# Patient Record
Sex: FEMALE | Race: WHITE | NOT HISPANIC OR LATINO | Employment: OTHER | URBAN - METROPOLITAN AREA
[De-identification: names, ages, dates, MRNs, and addresses within clinical notes are randomized per-mention and may not be internally consistent; named-entity substitution may affect disease eponyms.]

---

## 2017-01-09 RX ORDER — AMLODIPINE BESYLATE 10 MG/1
10 TABLET ORAL NIGHTLY
Qty: 30 TABLET | Refills: 5 | Status: SHIPPED | OUTPATIENT
Start: 2017-01-09 | End: 2017-02-10 | Stop reason: SDUPTHER

## 2017-01-20 DIAGNOSIS — E11.9 TYPE 2 DIABETES MELLITUS WITHOUT COMPLICATION: ICD-10-CM

## 2017-01-23 ENCOUNTER — CLINICAL SUPPORT (OUTPATIENT)
Dept: ELECTROPHYSIOLOGY | Facility: CLINIC | Age: 62
End: 2017-01-23

## 2017-01-23 DIAGNOSIS — Z95.818 STATUS POST PLACEMENT OF IMPLANTABLE LOOP RECORDER: ICD-10-CM

## 2017-01-23 DIAGNOSIS — I63.9 CRYPTOGENIC STROKE: ICD-10-CM

## 2017-01-23 PROCEDURE — 93299 LOOP RECORDER REMOTE: CPT | Mod: PBBFAC | Performed by: INTERNAL MEDICINE

## 2017-01-23 PROCEDURE — 93297 REM INTERROG DEV EVAL ICPMS: CPT | Mod: ,,, | Performed by: INTERNAL MEDICINE

## 2017-01-23 RX ORDER — FLUTICASONE PROPIONATE 50 MCG
2 SPRAY, SUSPENSION (ML) NASAL DAILY
Qty: 16 G | Refills: 1 | Status: SHIPPED | OUTPATIENT
Start: 2017-01-23 | End: 2017-11-15 | Stop reason: SDUPTHER

## 2017-02-10 RX ORDER — AMLODIPINE BESYLATE 10 MG/1
10 TABLET ORAL NIGHTLY
Qty: 30 TABLET | Refills: 5 | Status: SHIPPED | OUTPATIENT
Start: 2017-02-10 | End: 2017-02-13 | Stop reason: SDUPTHER

## 2017-02-13 ENCOUNTER — PATIENT MESSAGE (OUTPATIENT)
Dept: INTERNAL MEDICINE | Facility: CLINIC | Age: 62
End: 2017-02-13

## 2017-02-13 RX ORDER — AMLODIPINE BESYLATE 10 MG/1
10 TABLET ORAL NIGHTLY
Qty: 30 TABLET | Refills: 5 | Status: SHIPPED | OUTPATIENT
Start: 2017-02-13 | End: 2017-07-24 | Stop reason: SDUPTHER

## 2017-02-23 ENCOUNTER — CLINICAL SUPPORT (OUTPATIENT)
Dept: ELECTROPHYSIOLOGY | Facility: CLINIC | Age: 62
End: 2017-02-23

## 2017-02-23 DIAGNOSIS — Z95.818 STATUS POST PLACEMENT OF IMPLANTABLE LOOP RECORDER: ICD-10-CM

## 2017-02-23 DIAGNOSIS — I63.9 CRYPTOGENIC STROKE: ICD-10-CM

## 2017-02-23 PROCEDURE — 93297 REM INTERROG DEV EVAL ICPMS: CPT | Mod: ,,, | Performed by: INTERNAL MEDICINE

## 2017-02-23 PROCEDURE — 93299 LOOP RECORDER REMOTE: CPT | Mod: PBBFAC | Performed by: INTERNAL MEDICINE

## 2017-03-27 ENCOUNTER — CLINICAL SUPPORT (OUTPATIENT)
Dept: ELECTROPHYSIOLOGY | Facility: CLINIC | Age: 62
End: 2017-03-27

## 2017-03-27 DIAGNOSIS — I63.9 CRYPTOGENIC STROKE: ICD-10-CM

## 2017-03-27 DIAGNOSIS — Z95.818 STATUS POST PLACEMENT OF IMPLANTABLE LOOP RECORDER: ICD-10-CM

## 2017-03-27 PROCEDURE — 93299 LOOP RECORDER REMOTE: CPT | Mod: PBBFAC | Performed by: INTERNAL MEDICINE

## 2017-04-17 DIAGNOSIS — E11.9 TYPE 2 DIABETES MELLITUS WITHOUT COMPLICATION: ICD-10-CM

## 2017-04-28 PROCEDURE — 93297 REM INTERROG DEV EVAL ICPMS: CPT | Mod: ,,, | Performed by: INTERNAL MEDICINE

## 2017-05-29 ENCOUNTER — CLINICAL SUPPORT (OUTPATIENT)
Dept: ELECTROPHYSIOLOGY | Facility: CLINIC | Age: 62
End: 2017-05-29

## 2017-05-29 DIAGNOSIS — I63.9 CRYPTOGENIC STROKE: ICD-10-CM

## 2017-05-29 DIAGNOSIS — Z95.818 STATUS POST PLACEMENT OF IMPLANTABLE LOOP RECORDER: ICD-10-CM

## 2017-05-29 PROCEDURE — 93297 REM INTERROG DEV EVAL ICPMS: CPT | Mod: ,,, | Performed by: INTERNAL MEDICINE

## 2017-05-29 PROCEDURE — 93299 LOOP RECORDER REMOTE: CPT | Mod: ,,, | Performed by: INTERNAL MEDICINE

## 2017-06-02 ENCOUNTER — PATIENT MESSAGE (OUTPATIENT)
Dept: ELECTROPHYSIOLOGY | Facility: CLINIC | Age: 62
End: 2017-06-02

## 2017-07-10 ENCOUNTER — PATIENT MESSAGE (OUTPATIENT)
Dept: INTERNAL MEDICINE | Facility: CLINIC | Age: 62
End: 2017-07-10

## 2017-07-20 ENCOUNTER — CLINICAL SUPPORT (OUTPATIENT)
Dept: ELECTROPHYSIOLOGY | Facility: CLINIC | Age: 62
End: 2017-07-20

## 2017-07-20 DIAGNOSIS — Z95.818 STATUS POST PLACEMENT OF IMPLANTABLE LOOP RECORDER: ICD-10-CM

## 2017-07-20 DIAGNOSIS — I63.9 CRYPTOGENIC STROKE: ICD-10-CM

## 2017-07-20 PROCEDURE — 93299 LOOP RECORDER REMOTE: CPT | Mod: PBBFAC | Performed by: INTERNAL MEDICINE

## 2017-07-20 PROCEDURE — 93297 REM INTERROG DEV EVAL ICPMS: CPT | Mod: ,,, | Performed by: INTERNAL MEDICINE

## 2017-07-24 ENCOUNTER — PATIENT MESSAGE (OUTPATIENT)
Dept: INTERNAL MEDICINE | Facility: CLINIC | Age: 62
End: 2017-07-24

## 2017-07-25 RX ORDER — AMLODIPINE BESYLATE 10 MG/1
TABLET ORAL
Qty: 30 TABLET | Refills: 0 | Status: SHIPPED | OUTPATIENT
Start: 2017-07-25 | End: 2017-08-21 | Stop reason: SDUPTHER

## 2017-07-25 RX ORDER — AMLODIPINE BESYLATE 10 MG/1
10 TABLET ORAL NIGHTLY
Qty: 30 TABLET | Refills: 0 | Status: SHIPPED | OUTPATIENT
Start: 2017-07-25 | End: 2017-07-25 | Stop reason: SDUPTHER

## 2017-07-25 NOTE — TELEPHONE ENCOUNTER
I can give the patient a month of medication, but she needs to be seen because she is hypertensive and we need to address that ASAP. Thank you.

## 2017-07-28 DIAGNOSIS — Z12.11 COLON CANCER SCREENING: ICD-10-CM

## 2017-08-03 DIAGNOSIS — I63.449 CEREBROVASCULAR ACCIDENT (CVA) DUE TO EMBOLISM OF CEREBELLAR ARTERY, UNSPECIFIED BLOOD VESSEL LATERALITY: Primary | ICD-10-CM

## 2017-08-11 ENCOUNTER — OFFICE VISIT (OUTPATIENT)
Dept: ELECTROPHYSIOLOGY | Facility: CLINIC | Age: 62
End: 2017-08-11

## 2017-08-11 ENCOUNTER — HOSPITAL ENCOUNTER (OUTPATIENT)
Dept: CARDIOLOGY | Facility: CLINIC | Age: 62
Discharge: HOME | End: 2017-08-11

## 2017-08-11 VITALS
HEIGHT: 62 IN | WEIGHT: 148.13 LBS | DIASTOLIC BLOOD PRESSURE: 86 MMHG | SYSTOLIC BLOOD PRESSURE: 152 MMHG | HEART RATE: 67 BPM | BODY MASS INDEX: 27.26 KG/M2

## 2017-08-11 DIAGNOSIS — I10 HYPERTENSION, ESSENTIAL: ICD-10-CM

## 2017-08-11 DIAGNOSIS — I63.10 CEREBROVASCULAR ACCIDENT (CVA) DUE TO EMBOLISM OF PRECEREBRAL ARTERY: ICD-10-CM

## 2017-08-11 DIAGNOSIS — I63.511: Primary | ICD-10-CM

## 2017-08-11 DIAGNOSIS — Z91.199 NONCOMPLIANCE: ICD-10-CM

## 2017-08-11 DIAGNOSIS — I63.449 CEREBROVASCULAR ACCIDENT (CVA) DUE TO EMBOLISM OF CEREBELLAR ARTERY, UNSPECIFIED BLOOD VESSEL LATERALITY: ICD-10-CM

## 2017-08-11 DIAGNOSIS — E11.40 TYPE 2 DIABETES MELLITUS WITH DIABETIC NEUROPATHY, WITHOUT LONG-TERM CURRENT USE OF INSULIN: ICD-10-CM

## 2017-08-11 PROCEDURE — 99213 OFFICE O/P EST LOW 20 MIN: CPT | Mod: PBBFAC | Performed by: INTERNAL MEDICINE

## 2017-08-11 PROCEDURE — 3077F SYST BP >= 140 MM HG: CPT | Mod: ,,, | Performed by: INTERNAL MEDICINE

## 2017-08-11 PROCEDURE — 99214 OFFICE O/P EST MOD 30 MIN: CPT | Mod: S$PBB,,, | Performed by: INTERNAL MEDICINE

## 2017-08-11 PROCEDURE — 93005 ELECTROCARDIOGRAM TRACING: CPT | Mod: PBBFAC | Performed by: INTERNAL MEDICINE

## 2017-08-11 PROCEDURE — 3079F DIAST BP 80-89 MM HG: CPT | Mod: ,,, | Performed by: INTERNAL MEDICINE

## 2017-08-11 PROCEDURE — 99999 PR PBB SHADOW E&M-EST. PATIENT-LVL III: CPT | Mod: PBBFAC,,, | Performed by: INTERNAL MEDICINE

## 2017-08-11 PROCEDURE — 93010 ELECTROCARDIOGRAM REPORT: CPT | Mod: S$PBB,,, | Performed by: INTERNAL MEDICINE

## 2017-08-11 PROCEDURE — 3008F BODY MASS INDEX DOCD: CPT | Mod: ,,, | Performed by: INTERNAL MEDICINE

## 2017-08-11 RX ORDER — ASPIRIN 325 MG
325 TABLET ORAL DAILY
COMMUNITY

## 2017-08-11 RX ORDER — PYRIDOXINE HCL (VITAMIN B6) 100 MG
50 TABLET ORAL DAILY
COMMUNITY

## 2017-08-11 RX ORDER — IBUPROFEN 200 MG
200 TABLET ORAL EVERY 6 HOURS PRN
COMMUNITY

## 2017-08-11 RX ORDER — MULTIVIT WITH MINERALS/HERBS
1 TABLET ORAL DAILY
COMMUNITY

## 2017-08-11 NOTE — PROGRESS NOTES
Subjective:    Patient ID:  Devika Ashby is a 62 y.o. female who presents for follow-up of Embolic Stroke      HPI 63 yo female with Htn, DM, CVA,   [place] [facility] [place] with complaints of dizziness, an abnormal gait, and slurred speech.  CT head (04/11/16) revealed an age-indeterminate infarct of right caudate.  MRI brain (04/11/16) revealed an acute infarction in the right pontine hemisphere and left thalamus with subacute infarction in the left corona radiata. Ms. Ashby was found not to be a tPA candidate (2/2) as she was outside of the treatment window; no vascular intervention was required.   Ms. Ashby underwent ILR placement (04/13/16) without complication and was subsequently discharged.   ILR monitoring to date has not revealed significant arrhythmias.  Feeling well overall.  Denies palpitations, syncope, dizziness.  Has had less stress since quitting her job in December.      Review of Systems   Constitution: Negative. Negative for weakness and malaise/fatigue.   Cardiovascular: Negative for chest pain, dyspnea on exertion, irregular heartbeat, leg swelling, near-syncope, orthopnea, palpitations, paroxysmal nocturnal dyspnea and syncope.   Respiratory: Negative for cough and shortness of breath.    Neurological: Negative for dizziness and light-headedness.   All other systems reviewed and are negative.       Objective:    Physical Exam   Constitutional: She is oriented to person, place, and time. She appears well-developed and well-nourished.   Eyes: Conjunctivae are normal. No scleral icterus.   Neck: No JVD present. No tracheal deviation present.   Cardiovascular: Normal rate and regular rhythm.  PMI is not displaced.    Pulmonary/Chest: Effort normal and breath sounds normal. No respiratory distress.   Abdominal: Soft. There is no hepatosplenomegaly. There is no tenderness.   Musculoskeletal: She exhibits no edema or tenderness.   Neurological: She is alert and oriented to person,  place, and time.   Skin: Skin is warm and dry. No rash noted.   Psychiatric: She has a normal mood and affect. Her behavior is normal.         Assessment:       1. Cerebrovascular accident (CVA) involving right middle cerebral artery territory    2. Cerebrovascular accident (CVA) due to embolism of precerebral artery    3. Noncompliance    4. Hypertension, essential    5. Type 2 diabetes mellitus with diabetic neuropathy, without long-term current use of insulin         Plan:           Doing well with no evidence of atrial fibrillation to date.  I had recommended initiation of ace inhibitor.  She reports problems with this in the past, in that it increased her blood pressure.  She reports being on many different anti-hypertensives.  I recommended initiation of anti-hypertensive, she is strongly against it (and becomes a bit heated in discussion about it).  She understands risks regarding not being on therapy, including repeat stroke.  F/u in one year

## 2017-08-21 ENCOUNTER — CLINICAL SUPPORT (OUTPATIENT)
Dept: ELECTROPHYSIOLOGY | Facility: CLINIC | Age: 62
End: 2017-08-21

## 2017-08-21 ENCOUNTER — OFFICE VISIT (OUTPATIENT)
Dept: INTERNAL MEDICINE | Facility: CLINIC | Age: 62
End: 2017-08-21
Attending: FAMILY MEDICINE

## 2017-08-21 VITALS
HEIGHT: 62 IN | DIASTOLIC BLOOD PRESSURE: 76 MMHG | WEIGHT: 145.5 LBS | HEART RATE: 79 BPM | OXYGEN SATURATION: 97 % | BODY MASS INDEX: 26.78 KG/M2 | SYSTOLIC BLOOD PRESSURE: 142 MMHG

## 2017-08-21 DIAGNOSIS — Z91.199 NONCOMPLIANCE: ICD-10-CM

## 2017-08-21 DIAGNOSIS — Z95.818 STATUS POST PLACEMENT OF IMPLANTABLE LOOP RECORDER: ICD-10-CM

## 2017-08-21 DIAGNOSIS — G70.9 NEUROMUSCULAR DISEASE: ICD-10-CM

## 2017-08-21 DIAGNOSIS — E11.40 TYPE 2 DIABETES MELLITUS WITH DIABETIC NEUROPATHY, WITHOUT LONG-TERM CURRENT USE OF INSULIN: ICD-10-CM

## 2017-08-21 DIAGNOSIS — I63.511: ICD-10-CM

## 2017-08-21 DIAGNOSIS — I63.9 CRYPTOGENIC STROKE: ICD-10-CM

## 2017-08-21 DIAGNOSIS — I10 HYPERTENSION, ESSENTIAL: ICD-10-CM

## 2017-08-21 DIAGNOSIS — E78.5 HYPERLIPIDEMIA, UNSPECIFIED HYPERLIPIDEMIA TYPE: ICD-10-CM

## 2017-08-21 DIAGNOSIS — Z91.199 NONCOMPLIANCE BY REFUSING INTERVENTION OR SUPPORT: ICD-10-CM

## 2017-08-21 PROCEDURE — 99214 OFFICE O/P EST MOD 30 MIN: CPT | Mod: PBBFAC | Performed by: FAMILY MEDICINE

## 2017-08-21 PROCEDURE — 3078F DIAST BP <80 MM HG: CPT | Mod: ,,, | Performed by: FAMILY MEDICINE

## 2017-08-21 PROCEDURE — 99214 OFFICE O/P EST MOD 30 MIN: CPT | Mod: S$PBB,,, | Performed by: FAMILY MEDICINE

## 2017-08-21 PROCEDURE — 3008F BODY MASS INDEX DOCD: CPT | Mod: ,,, | Performed by: FAMILY MEDICINE

## 2017-08-21 PROCEDURE — 99999 PR PBB SHADOW E&M-EST. PATIENT-LVL IV: CPT | Mod: PBBFAC,,, | Performed by: FAMILY MEDICINE

## 2017-08-21 PROCEDURE — 3077F SYST BP >= 140 MM HG: CPT | Mod: ,,, | Performed by: FAMILY MEDICINE

## 2017-08-21 PROCEDURE — 93299 LOOP RECORDER REMOTE: CPT | Mod: PBBFAC | Performed by: INTERNAL MEDICINE

## 2017-08-21 RX ORDER — MUPIROCIN 20 MG/G
OINTMENT TOPICAL 3 TIMES DAILY
Qty: 22 G | Refills: 1 | Status: SHIPPED | OUTPATIENT
Start: 2017-08-21 | End: 2017-08-28

## 2017-08-21 RX ORDER — AMLODIPINE BESYLATE 10 MG/1
10 TABLET ORAL NIGHTLY
Qty: 30 TABLET | Refills: 3 | Status: SHIPPED | OUTPATIENT
Start: 2017-08-21 | End: 2017-11-15 | Stop reason: SDUPTHER

## 2017-08-21 NOTE — PROGRESS NOTES
Subjective:       Patient ID: Devika Ashby is a 62 y.o. female.    Chief Complaint: Medication Refill    HPI  Review of Systems   Constitutional: Negative for chills, fatigue and fever.   HENT: Negative for congestion and trouble swallowing.    Eyes: Negative for redness.   Respiratory: Negative for cough, chest tightness and shortness of breath.    Cardiovascular: Negative for chest pain, palpitations and leg swelling.   Gastrointestinal: Negative for abdominal pain and blood in stool.   Genitourinary: Negative for hematuria and menstrual problem.   Musculoskeletal: Negative for arthralgias, back pain, gait problem, joint swelling, myalgias and neck pain.   Skin: Negative for color change and rash.   Neurological: Negative for tremors, speech difficulty, weakness, numbness and headaches.   Hematological: Negative for adenopathy. Does not bruise/bleed easily.   Psychiatric/Behavioral: Negative for behavioral problems, confusion and sleep disturbance. The patient is not nervous/anxious.        Objective:      Physical Exam   Constitutional: She is oriented to person, place, and time. She appears well-developed and well-nourished. No distress.   Neck: Neck supple.   Cardiovascular:   Pulses:       Dorsalis pedis pulses are 2+ on the right side, and 2+ on the left side.   Pulmonary/Chest: Effort normal.   Musculoskeletal: She exhibits no edema.        Right lower leg: She exhibits no edema.        Left lower leg: She exhibits no edema.   Feet:   Right Foot:   Protective Sensation: 3 sites tested. 3 sites sensed.   Skin Integrity: Negative for skin breakdown.   Left Foot:   Protective Sensation: 3 sites tested. 3 sites sensed.   Skin Integrity: Negative for skin breakdown.   Neurological: She is alert and oriented to person, place, and time. GCS eye subscore is 4. GCS verbal subscore is 5. GCS motor subscore is 6.   Skin: Skin is warm and dry. No rash noted.   Psychiatric: She has a normal mood and affect. Her  behavior is normal. Judgment and thought content normal. Her speech is slurred.   Nursing note and vitals reviewed.      Assessment:       1. Uncontrolled type 2 diabetes mellitus with complication, without long-term current use of insulin    2. Hypertension, essential    3. Hyperlipidemia, unspecified hyperlipidemia type    4. Type 2 diabetes mellitus with diabetic neuropathy, without long-term current use of insulin    5. Cerebrovascular accident (CVA) involving right middle cerebral artery territory    6. Noncompliance    7. Noncompliance by refusing intervention or support    8. Neuromuscular disease        Plan:   Devika was seen today for medication refill.    Diagnoses and all orders for this visit:    Uncontrolled type 2 diabetes mellitus with complication, without long-term current use of insulin  -     Hemoglobin A1c; Future  -     Lipid panel; Future  -     Comprehensive metabolic panel; Future  -     Microalbumin/creatinine urine ratio; Future  -     Ambulatory referral to Optometry  -     Ambulatory consult to Podiatry  -     Ambulatory Referral to Diabetes Education    Hypertension, essential    Hyperlipidemia, unspecified hyperlipidemia type    Type 2 diabetes mellitus with diabetic neuropathy, without long-term current use of insulin  -     Hemoglobin A1c; Future  -     Lipid panel; Future  -     Comprehensive metabolic panel; Future  -     Microalbumin/creatinine urine ratio; Future  -     Ambulatory referral to Optometry  -     Ambulatory consult to Podiatry  -     Ambulatory Referral to Diabetes Education    Cerebrovascular accident (CVA) involving right middle cerebral artery territory    Noncompliance    Noncompliance by refusing intervention or support    Neuromuscular disease    Other orders  -     amlodipine (NORVASC) 10 MG tablet; Take 1 tablet (10 mg total) by mouth every evening.  -     mupirocin (BACTROBAN) 2 % ointment; Apply topically 3 (three) times daily.      See meds, orders, follow  "up, routing and instructions sections of encounter.    "This note will not be shared with the patient."    A 62-year-old established female patient.  She is in for a followup at our   request.  She was out of medications.  The patient was counseled about health   maintenance and her medical conditions.  She refused treatment as follows:    Refuses health screenings such as mammogram, colonoscopy, and female health.    Refuses treatment for her hypertension other than Norvasc and states that she   has intolerances to all other blood pressure medications.    Refuses treatment for diabetes, stating she has intolerance to all diabetic   medications including insulin.    Refuses treatment for dyslipidemia and states she has some sort of neuromuscular   condition that is being treated through a neurosurgeon.  When asked who the   neurosurgeon is.  She states she will not provide his name because she is in   "some sort of research study."    I explained the consequences of not treating her current medical conditions   including, but not limited to, stroke, death, heart attack, limb amputation,   blindness and renal failure, possibly requiring dialysis.    The patient will only allow us to refill Norvasc today and she states she needs   a refill of topical mupirocin stating that OTC creams do not work.      MARISA/IN  dd: 08/21/2017 14:35:42 (CDT)  td: 08/21/2017 23:32:40 (CDT)  Doc ID   #[id number]  Job ID #[id number]    CC:       "

## 2017-08-22 ENCOUNTER — PATIENT MESSAGE (OUTPATIENT)
Dept: INTERNAL MEDICINE | Facility: CLINIC | Age: 62
End: 2017-08-22

## 2017-08-24 ENCOUNTER — TELEPHONE (OUTPATIENT)
Dept: DIABETES | Facility: CLINIC | Age: 62
End: 2017-08-24

## 2017-08-25 ENCOUNTER — TELEPHONE (OUTPATIENT)
Dept: DIABETES | Facility: CLINIC | Age: 62
End: 2017-08-25

## 2017-08-29 PROCEDURE — 93297 REM INTERROG DEV EVAL ICPMS: CPT | Mod: ,,, | Performed by: INTERNAL MEDICINE

## 2017-10-03 ENCOUNTER — CLINICAL SUPPORT (OUTPATIENT)
Dept: ELECTROPHYSIOLOGY | Facility: CLINIC | Age: 62
End: 2017-10-03

## 2017-10-03 DIAGNOSIS — I63.9 CRYPTOGENIC STROKE: ICD-10-CM

## 2017-10-03 DIAGNOSIS — Z95.818 STATUS POST PLACEMENT OF IMPLANTABLE LOOP RECORDER: ICD-10-CM

## 2017-10-03 PROCEDURE — 93298 REM INTERROG DEV EVAL SCRMS: CPT | Mod: ,,, | Performed by: INTERNAL MEDICINE

## 2017-10-03 PROCEDURE — 93299 LOOP RECORDER REMOTE: CPT | Mod: PBBFAC | Performed by: INTERNAL MEDICINE

## 2017-10-03 PROCEDURE — 93297 REM INTERROG DEV EVAL ICPMS: CPT | Mod: ,,, | Performed by: INTERNAL MEDICINE

## 2017-10-05 ENCOUNTER — LAB VISIT (OUTPATIENT)
Dept: LAB | Facility: HOSPITAL | Age: 62
End: 2017-10-05
Attending: FAMILY MEDICINE

## 2017-10-05 DIAGNOSIS — E11.40 TYPE 2 DIABETES MELLITUS WITH DIABETIC NEUROPATHY, WITHOUT LONG-TERM CURRENT USE OF INSULIN: ICD-10-CM

## 2017-10-05 LAB
ALBUMIN SERPL BCP-MCNC: 4 G/DL
ALP SERPL-CCNC: 155 U/L
ALT SERPL W/O P-5'-P-CCNC: 22 U/L
ANION GAP SERPL CALC-SCNC: 12 MMOL/L
AST SERPL-CCNC: 17 U/L
BILIRUB SERPL-MCNC: 0.6 MG/DL
BUN SERPL-MCNC: 21 MG/DL
CALCIUM SERPL-MCNC: 9.5 MG/DL
CHLORIDE SERPL-SCNC: 105 MMOL/L
CHOLEST SERPL-MCNC: 293 MG/DL
CHOLEST/HDLC SERPL: 8.1 {RATIO}
CO2 SERPL-SCNC: 24 MMOL/L
CREAT SERPL-MCNC: 1.1 MG/DL
EST. GFR  (AFRICAN AMERICAN): >60 ML/MIN/1.73 M^2
EST. GFR  (NON AFRICAN AMERICAN): 53.2 ML/MIN/1.73 M^2
ESTIMATED AVG GLUCOSE: 197 MG/DL
GLUCOSE SERPL-MCNC: 229 MG/DL
HBA1C MFR BLD HPLC: 8.5 %
HDLC SERPL-MCNC: 36 MG/DL
HDLC SERPL: 12.3 %
LDLC SERPL CALC-MCNC: 198 MG/DL
NONHDLC SERPL-MCNC: 257 MG/DL
POTASSIUM SERPL-SCNC: 3.9 MMOL/L
PROT SERPL-MCNC: 7.3 G/DL
SODIUM SERPL-SCNC: 141 MMOL/L
TRIGL SERPL-MCNC: 295 MG/DL

## 2017-10-05 PROCEDURE — 80053 COMPREHEN METABOLIC PANEL: CPT

## 2017-10-05 PROCEDURE — 36415 COLL VENOUS BLD VENIPUNCTURE: CPT

## 2017-10-05 PROCEDURE — 80061 LIPID PANEL: CPT

## 2017-10-05 PROCEDURE — 83036 HEMOGLOBIN GLYCOSYLATED A1C: CPT

## 2017-10-07 ENCOUNTER — TELEPHONE (OUTPATIENT)
Dept: INTERNAL MEDICINE | Facility: CLINIC | Age: 62
End: 2017-10-07

## 2017-10-07 ENCOUNTER — PATIENT MESSAGE (OUTPATIENT)
Dept: INTERNAL MEDICINE | Facility: CLINIC | Age: 62
End: 2017-10-07

## 2017-10-10 PROBLEM — Z53.20 REFUSAL OF STATIN MEDICATION BY PATIENT: Status: ACTIVE | Noted: 2017-10-10

## 2017-10-21 ENCOUNTER — TELEPHONE (OUTPATIENT)
Dept: INTERNAL MEDICINE | Facility: CLINIC | Age: 62
End: 2017-10-21

## 2017-10-21 ENCOUNTER — PATIENT MESSAGE (OUTPATIENT)
Dept: INTERNAL MEDICINE | Facility: CLINIC | Age: 62
End: 2017-10-21

## 2017-10-21 NOTE — TELEPHONE ENCOUNTER
Called patient and discussed labs and or test results. Patient expressed understanding and had the opportunity to ask questions. Any questions were answered. See meds, orders, follow up and instructions sections of encounter.    Specific issues include:  Chol and DM    Patient states she cannot take insulin  Patient states she cannot take Chol med    Refuses Cardiology and Endocrinology referrals    Explained to her that her decision is dangerous based on excess risk - I explained that I am also obliged to make sensible recommendations that will benefit health, make  life better and are within the standards of medical care. Diabetes and elevated blood cholesterol are known high risk conditions for having heart attacks, recurrent stroke, kidney disease, eye and limb problems, etc.     Patient states that she understands, wants to manage this on her own and does not want to make a follow up appt at this time.

## 2017-10-24 ENCOUNTER — PATIENT MESSAGE (OUTPATIENT)
Dept: INTERNAL MEDICINE | Facility: CLINIC | Age: 62
End: 2017-10-24

## 2017-11-03 ENCOUNTER — CLINICAL SUPPORT (OUTPATIENT)
Dept: ELECTROPHYSIOLOGY | Facility: CLINIC | Age: 62
End: 2017-11-03
Attending: INTERNAL MEDICINE

## 2017-11-03 DIAGNOSIS — Z95.818 STATUS POST PLACEMENT OF IMPLANTABLE LOOP RECORDER: ICD-10-CM

## 2017-11-03 DIAGNOSIS — I63.9 CRYPTOGENIC STROKE: ICD-10-CM

## 2017-11-03 PROCEDURE — 93299 LOOP RECORDER REMOTE: CPT | Mod: PBBFAC | Performed by: INTERNAL MEDICINE

## 2017-11-03 PROCEDURE — 93298 REM INTERROG DEV EVAL SCRMS: CPT | Mod: ,,, | Performed by: INTERNAL MEDICINE

## 2017-11-10 ENCOUNTER — PATIENT MESSAGE (OUTPATIENT)
Dept: ADMINISTRATIVE | Facility: HOSPITAL | Age: 62
End: 2017-11-10

## 2017-11-15 ENCOUNTER — PATIENT MESSAGE (OUTPATIENT)
Dept: INTERNAL MEDICINE | Facility: CLINIC | Age: 62
End: 2017-11-15

## 2017-11-15 RX ORDER — AMLODIPINE BESYLATE 10 MG/1
10 TABLET ORAL NIGHTLY
Qty: 30 TABLET | Refills: 3 | Status: SHIPPED | OUTPATIENT
Start: 2017-11-15 | End: 2018-02-19 | Stop reason: SDUPTHER

## 2017-11-15 RX ORDER — FLUTICASONE PROPIONATE 50 MCG
2 SPRAY, SUSPENSION (ML) NASAL DAILY
Qty: 16 G | Refills: 1 | Status: SHIPPED | OUTPATIENT
Start: 2017-11-15

## 2017-12-04 ENCOUNTER — CLINICAL SUPPORT (OUTPATIENT)
Dept: ELECTROPHYSIOLOGY | Facility: CLINIC | Age: 62
End: 2017-12-04
Attending: INTERNAL MEDICINE

## 2017-12-04 DIAGNOSIS — Z95.818 STATUS POST PLACEMENT OF IMPLANTABLE LOOP RECORDER: ICD-10-CM

## 2017-12-04 DIAGNOSIS — I63.9 CRYPTOGENIC STROKE: ICD-10-CM

## 2017-12-04 PROCEDURE — 93298 REM INTERROG DEV EVAL SCRMS: CPT | Mod: ,,, | Performed by: INTERNAL MEDICINE

## 2017-12-04 PROCEDURE — 93299 LOOP RECORDER REMOTE: CPT | Mod: PBBFAC | Performed by: INTERNAL MEDICINE

## 2018-01-04 ENCOUNTER — CLINICAL SUPPORT (OUTPATIENT)
Dept: ELECTROPHYSIOLOGY | Facility: CLINIC | Age: 63
End: 2018-01-04
Attending: INTERNAL MEDICINE

## 2018-01-04 DIAGNOSIS — Z95.818 STATUS POST PLACEMENT OF IMPLANTABLE LOOP RECORDER: ICD-10-CM

## 2018-01-04 DIAGNOSIS — I63.9 CRYPTOGENIC STROKE: ICD-10-CM

## 2018-01-04 PROCEDURE — 93299 LOOP RECORDER REMOTE: CPT | Mod: PBBFAC | Performed by: INTERNAL MEDICINE

## 2018-01-04 PROCEDURE — 93298 REM INTERROG DEV EVAL SCRMS: CPT | Mod: ,,, | Performed by: INTERNAL MEDICINE

## 2018-01-27 DIAGNOSIS — Z95.818 STATUS POST PLACEMENT OF IMPLANTABLE LOOP RECORDER: Primary | ICD-10-CM

## 2018-01-27 DIAGNOSIS — I63.9 CRYPTOGENIC STROKE: ICD-10-CM

## 2018-02-06 ENCOUNTER — CLINICAL SUPPORT (OUTPATIENT)
Dept: ELECTROPHYSIOLOGY | Facility: CLINIC | Age: 63
End: 2018-02-06
Attending: INTERNAL MEDICINE

## 2018-02-06 DIAGNOSIS — Z95.818 STATUS POST PLACEMENT OF IMPLANTABLE LOOP RECORDER: ICD-10-CM

## 2018-02-06 DIAGNOSIS — I63.9 CRYPTOGENIC STROKE: ICD-10-CM

## 2018-02-21 ENCOUNTER — PATIENT MESSAGE (OUTPATIENT)
Dept: INTERNAL MEDICINE | Facility: CLINIC | Age: 63
End: 2018-02-21

## 2018-02-21 RX ORDER — AMLODIPINE BESYLATE 10 MG/1
10 TABLET ORAL NIGHTLY
Qty: 30 TABLET | Refills: 0 | Status: SHIPPED | OUTPATIENT
Start: 2018-02-21 | End: 2018-03-14 | Stop reason: SDUPTHER

## 2018-02-22 ENCOUNTER — PATIENT MESSAGE (OUTPATIENT)
Dept: INTERNAL MEDICINE | Facility: CLINIC | Age: 63
End: 2018-02-22

## 2018-02-22 NOTE — TELEPHONE ENCOUNTER
Please call patient and schedule patient for an appointment with me - she has not been seen recently and will need an appointment to continue to get medicaiton refills. Thank you.

## 2018-02-22 NOTE — TELEPHONE ENCOUNTER
Called pt no answer left message on voicemail and pt portal requesting a call back to schedule appointment

## 2018-03-08 ENCOUNTER — CLINICAL SUPPORT (OUTPATIENT)
Dept: ELECTROPHYSIOLOGY | Facility: CLINIC | Age: 63
End: 2018-03-08
Attending: INTERNAL MEDICINE

## 2018-03-08 DIAGNOSIS — Z95.818 STATUS POST PLACEMENT OF IMPLANTABLE LOOP RECORDER: ICD-10-CM

## 2018-03-08 DIAGNOSIS — I63.9 CRYPTOGENIC STROKE: ICD-10-CM

## 2018-03-14 ENCOUNTER — OFFICE VISIT (OUTPATIENT)
Dept: INTERNAL MEDICINE | Facility: CLINIC | Age: 63
End: 2018-03-14
Attending: FAMILY MEDICINE

## 2018-03-14 VITALS
BODY MASS INDEX: 26.74 KG/M2 | HEART RATE: 96 BPM | HEIGHT: 62 IN | DIASTOLIC BLOOD PRESSURE: 70 MMHG | WEIGHT: 145.31 LBS | SYSTOLIC BLOOD PRESSURE: 150 MMHG

## 2018-03-14 DIAGNOSIS — E78.5 HYPERLIPIDEMIA, UNSPECIFIED HYPERLIPIDEMIA TYPE: ICD-10-CM

## 2018-03-14 DIAGNOSIS — Z53.20 MAMMOGRAM DECLINED: ICD-10-CM

## 2018-03-14 DIAGNOSIS — I10 HYPERTENSION, ESSENTIAL: ICD-10-CM

## 2018-03-14 DIAGNOSIS — Z91.199 NONCOMPLIANCE BY REFUSING INTERVENTION OR SUPPORT: ICD-10-CM

## 2018-03-14 DIAGNOSIS — Z53.20 COLONOSCOPY REFUSED: ICD-10-CM

## 2018-03-14 DIAGNOSIS — Z53.20 REFUSAL OF STATIN MEDICATION BY PATIENT: ICD-10-CM

## 2018-03-14 DIAGNOSIS — I63.10 CEREBROVASCULAR ACCIDENT (CVA) DUE TO EMBOLISM OF PRECEREBRAL ARTERY: ICD-10-CM

## 2018-03-14 PROCEDURE — 99214 OFFICE O/P EST MOD 30 MIN: CPT | Mod: S$PBB,,, | Performed by: FAMILY MEDICINE

## 2018-03-14 PROCEDURE — 99213 OFFICE O/P EST LOW 20 MIN: CPT | Mod: PBBFAC | Performed by: FAMILY MEDICINE

## 2018-03-14 PROCEDURE — 99999 PR PBB SHADOW E&M-EST. PATIENT-LVL III: CPT | Mod: PBBFAC,,, | Performed by: FAMILY MEDICINE

## 2018-03-14 RX ORDER — TRETINOIN 0.5 MG/G
CREAM TOPICAL NIGHTLY
Qty: 20 G | Refills: 2 | Status: SHIPPED | OUTPATIENT
Start: 2018-03-14

## 2018-03-14 RX ORDER — AMLODIPINE BESYLATE 10 MG/1
10 TABLET ORAL NIGHTLY
Qty: 90 TABLET | Refills: 3 | Status: SHIPPED | OUTPATIENT
Start: 2018-03-14 | End: 2019-02-19 | Stop reason: SDUPTHER

## 2018-03-14 NOTE — PROGRESS NOTES
Subjective:       Patient ID: Devika Ashby is a 63 y.o. female.    Chief Complaint: Medication Refill and Blood Pressure Check    HPI  Review of Systems   Constitutional: Negative for chills, fatigue and fever.   HENT: Negative for congestion and trouble swallowing.    Eyes: Negative for redness.   Respiratory: Negative for cough, chest tightness and shortness of breath.    Cardiovascular: Negative for chest pain, palpitations and leg swelling.   Gastrointestinal: Negative for abdominal pain and blood in stool.   Genitourinary: Negative for hematuria and menstrual problem.   Musculoskeletal: Negative for arthralgias, back pain, gait problem, joint swelling, myalgias and neck pain.   Skin: Negative for color change and rash.   Neurological: Negative for tremors, speech difficulty, weakness, numbness and headaches.   Hematological: Negative for adenopathy. Does not bruise/bleed easily.   Psychiatric/Behavioral: Negative for behavioral problems, confusion and sleep disturbance. The patient is not nervous/anxious.        Objective:      Physical Exam   Constitutional: She is oriented to person, place, and time. She appears well-developed and well-nourished. No distress.   Neck: Neck supple.   Pulmonary/Chest: Effort normal.   Musculoskeletal: She exhibits no edema.        Right lower leg: She exhibits no edema.        Left lower leg: She exhibits no edema.   Feet:   Right Foot:   Protective Sensation: 3 sites tested. 3 sites sensed.   Skin Integrity: Negative for skin breakdown.   Left Foot:   Protective Sensation: 3 sites tested. 3 sites sensed.   Skin Integrity: Negative for skin breakdown.   Neurological: She is alert and oriented to person, place, and time.   Skin: Skin is warm and dry. No rash noted.   Psychiatric: She has a normal mood and affect. Her behavior is normal. Judgment and thought content normal.   Nursing note and vitals reviewed.      Assessment:       1. Uncontrolled type 2 diabetes mellitus with  complication, without long-term current use of insulin    2. Hypertension, essential    3. Hyperlipidemia, unspecified hyperlipidemia type    4. Noncompliance by refusing intervention or support    5. Colonoscopy refused    6. Cerebrovascular accident (CVA) due to embolism of precerebral artery    7. Mammogram declined    8. Refusal of statin medication by patient        Plan:   Devika was seen today for medication refill and blood pressure check.    Diagnoses and all orders for this visit:    Uncontrolled type 2 diabetes mellitus with complication, without long-term current use of insulin  -     Hemoglobin A1c; Standing  -     Comprehensive metabolic panel; Standing  -     Lipid panel; Standing  -     Microalbumin/creatinine urine ratio; Standing    Hypertension, essential    Hyperlipidemia, unspecified hyperlipidemia type  -     Lipid panel; Standing    Noncompliance by refusing intervention or support  Comments:  Will not take any medications for cholesterol, diabetes and only norvasc for HTN. I explained in great detail the extreme risk she is placing herself at for cat    Colonoscopy refused    Cerebrovascular accident (CVA) due to embolism of precerebral artery    Mammogram declined    Refusal of statin medication by patient    Other orders  -     amLODIPine (NORVASC) 10 MG tablet; Take 1 tablet (10 mg total) by mouth every evening.  -     tretinoin (RETIN-A) 0.05 % cream; Apply topically every evening.      See meds, orders, follow up, routing and instructions sections of encounter.  This is a 63-year-old established female patient who presents at our request for   a followup.  She has uncontrolled diabetes, uncontrolled hypertension,   uncontrolled hyperlipidemia with a prior history of CVA.  The patient states she   is intolerant to all statins, all diabetes medicines and every hypertension   medicine except Norvasc.  She cites numerous complications including pancreas,   muscle aches, fatigue, malaise,  dizziness, etc.  Her last laboratory was   reviewed.  The patient refuses to have laboratory done at this time.  She states   she might consider in approximately six months.  She states her only interest   today is getting her Norvasc refilled.  I asked about several medications   including, but not limited to diuretics, ACEI, ARB and clonidine.  She states   all have or will cause side effects.  Additionally, I asked about all forms of   diabetic including oral and insulin.  She stated all have or will cause side   effects.  We additionally discussed health maintenance such as colon and rectal   cancer screening, breast cancer screening, screening for retinopathy, neuropathy   with Optometry and Podiatry.  We additionally discussed mammography and   colonoscopy.  She refuses all support at this time.  She refuses laboratory to   be drawn.  She refuses all medications with the exception of amlodipine to be   refilled.  She requests a six-month followup.    The patient was informed at length and specifically concerning her failure to   accept care in the face of known diabetes, known hyperlipidemia, known   hypertension and lacking health maintenance.  In no uncertain terms, I explained   that she is putting herself at high-risk for ocular, cardiovascular, renal,   neurologic and malignant conditions that can be preventable through our   attention and investigation.  She seemed to understand this and still refuse   treatment.    RECOMMENDATIONS:  Norvasc, labs placed, follow up in six months at the patient   request.  I asked her should she change her mind for any of the above and to let   us know, we will be glad to up-to-date her on all aspects of her care and   health maintenance.      MARISA/HN  dd: 03/14/2018 14:59:33 (CDT)  td: 03/15/2018 10:16:17 (CDT)  Doc ID   #[id number]  Job ID #[id number]    CC:         Will not take any medications for cholesterol, diabetes and only norvasc for HTN. I explained in great detail the  extreme risk she is placing herself at for catastrophic or fatal CV events (MI, CVA) or slower events such as loss or sight, limb, function, ESRD, dialysis, PVD, amputations, etc. This additionally includes cancers of the breast or colon.

## 2018-04-09 ENCOUNTER — CLINICAL SUPPORT (OUTPATIENT)
Dept: ELECTROPHYSIOLOGY | Facility: CLINIC | Age: 63
End: 2018-04-09
Attending: INTERNAL MEDICINE

## 2018-04-09 DIAGNOSIS — Z95.818 STATUS POST PLACEMENT OF IMPLANTABLE LOOP RECORDER: ICD-10-CM

## 2018-04-09 DIAGNOSIS — I63.9 CRYPTOGENIC STROKE: ICD-10-CM

## 2018-04-18 ENCOUNTER — PATIENT MESSAGE (OUTPATIENT)
Dept: ADMINISTRATIVE | Facility: HOSPITAL | Age: 63
End: 2018-04-18

## 2018-07-18 ENCOUNTER — PATIENT MESSAGE (OUTPATIENT)
Dept: ADMINISTRATIVE | Facility: HOSPITAL | Age: 63
End: 2018-07-18

## 2018-08-03 DIAGNOSIS — Z12.11 COLON CANCER SCREENING: ICD-10-CM

## 2019-02-19 ENCOUNTER — PATIENT MESSAGE (OUTPATIENT)
Dept: INTERNAL MEDICINE | Facility: CLINIC | Age: 64
End: 2019-02-19

## 2019-02-19 RX ORDER — AMLODIPINE BESYLATE 10 MG/1
10 TABLET ORAL NIGHTLY
Qty: 90 TABLET | Refills: 0 | Status: SHIPPED | OUTPATIENT
Start: 2019-02-19 | End: 2019-03-13 | Stop reason: SDUPTHER

## 2019-02-19 NOTE — TELEPHONE ENCOUNTER
Please see below and advise. Thank you NL  Please place lab orders if needed. Thank you  Refill request was pended separately

## 2019-02-20 NOTE — TELEPHONE ENCOUNTER
See standing lab orders and please draw A1C, CMP, Lipids and UA microalb - soon    Patient is due for a follow up appointment - please call patient to schedule appointment.

## 2019-02-22 DIAGNOSIS — E11.9 TYPE 2 DIABETES MELLITUS WITHOUT COMPLICATION, UNSPECIFIED WHETHER LONG TERM INSULIN USE: ICD-10-CM

## 2019-03-04 ENCOUNTER — LAB VISIT (OUTPATIENT)
Dept: LAB | Facility: HOSPITAL | Age: 64
End: 2019-03-04
Attending: FAMILY MEDICINE
Payer: MEDICARE

## 2019-03-04 DIAGNOSIS — E78.5 HYPERLIPIDEMIA, UNSPECIFIED HYPERLIPIDEMIA TYPE: ICD-10-CM

## 2019-03-04 LAB
ALBUMIN SERPL BCP-MCNC: 4.3 G/DL
ALP SERPL-CCNC: 150 U/L
ALT SERPL W/O P-5'-P-CCNC: 16 U/L
ANION GAP SERPL CALC-SCNC: 13 MMOL/L
AST SERPL-CCNC: 23 U/L
BILIRUB SERPL-MCNC: 0.6 MG/DL
BUN SERPL-MCNC: 21 MG/DL
CALCIUM SERPL-MCNC: 9.8 MG/DL
CHLORIDE SERPL-SCNC: 103 MMOL/L
CHOLEST SERPL-MCNC: 309 MG/DL
CHOLEST/HDLC SERPL: 8.4 {RATIO}
CO2 SERPL-SCNC: 21 MMOL/L
CREAT SERPL-MCNC: 1.1 MG/DL
EST. GFR  (AFRICAN AMERICAN): >60 ML/MIN/1.73 M^2
EST. GFR  (NON AFRICAN AMERICAN): 52.4 ML/MIN/1.73 M^2
ESTIMATED AVG GLUCOSE: 278 MG/DL
GLUCOSE SERPL-MCNC: 271 MG/DL
HBA1C MFR BLD HPLC: 11.3 %
HDLC SERPL-MCNC: 37 MG/DL
HDLC SERPL: 12 %
LDLC SERPL CALC-MCNC: 208.8 MG/DL
NONHDLC SERPL-MCNC: 272 MG/DL
POTASSIUM SERPL-SCNC: 4.3 MMOL/L
PROT SERPL-MCNC: 7.7 G/DL
SODIUM SERPL-SCNC: 137 MMOL/L
TRIGL SERPL-MCNC: 316 MG/DL

## 2019-03-04 PROCEDURE — 83036 HEMOGLOBIN GLYCOSYLATED A1C: CPT

## 2019-03-04 PROCEDURE — 36415 COLL VENOUS BLD VENIPUNCTURE: CPT

## 2019-03-04 PROCEDURE — 80053 COMPREHEN METABOLIC PANEL: CPT

## 2019-03-04 PROCEDURE — 80061 LIPID PANEL: CPT

## 2019-03-13 ENCOUNTER — OFFICE VISIT (OUTPATIENT)
Dept: INTERNAL MEDICINE | Facility: CLINIC | Age: 64
End: 2019-03-13
Attending: FAMILY MEDICINE
Payer: MEDICARE

## 2019-03-13 VITALS
HEIGHT: 62 IN | OXYGEN SATURATION: 96 % | SYSTOLIC BLOOD PRESSURE: 142 MMHG | TEMPERATURE: 98 F | HEART RATE: 84 BPM | WEIGHT: 132.81 LBS | DIASTOLIC BLOOD PRESSURE: 80 MMHG | BODY MASS INDEX: 24.44 KG/M2

## 2019-03-13 DIAGNOSIS — I63.10 CEREBROVASCULAR ACCIDENT (CVA) DUE TO EMBOLISM OF PRECEREBRAL ARTERY: ICD-10-CM

## 2019-03-13 DIAGNOSIS — E11.65 UNCONTROLLED TYPE 2 DIABETES MELLITUS WITH HYPERGLYCEMIA: ICD-10-CM

## 2019-03-13 DIAGNOSIS — Z28.21 PNEUMOCOCCAL VACCINE REFUSED: ICD-10-CM

## 2019-03-13 DIAGNOSIS — Z53.20 REFUSED ASSESSMENT OF PHYSICAL HEALTH: ICD-10-CM

## 2019-03-13 DIAGNOSIS — Z53.20 REFUSAL OF STATIN MEDICATION BY PATIENT: ICD-10-CM

## 2019-03-13 DIAGNOSIS — Z53.20 MEDICATION REFUSED: ICD-10-CM

## 2019-03-13 DIAGNOSIS — Z53.20 REFUSES HYPERTENSION MONITORING: ICD-10-CM

## 2019-03-13 DIAGNOSIS — Z91.199 NONCOMPLIANCE BY REFUSING INTERVENTION OR SUPPORT: ICD-10-CM

## 2019-03-13 DIAGNOSIS — E11.8 TYPE 2 DIABETES MELLITUS WITH COMPLICATION, WITHOUT LONG-TERM CURRENT USE OF INSULIN: ICD-10-CM

## 2019-03-13 DIAGNOSIS — Z53.20 COLONOSCOPY REFUSED: ICD-10-CM

## 2019-03-13 DIAGNOSIS — Z53.20 MAMMOGRAM DECLINED: ICD-10-CM

## 2019-03-13 DIAGNOSIS — E78.5 HYPERLIPIDEMIA, UNSPECIFIED HYPERLIPIDEMIA TYPE: ICD-10-CM

## 2019-03-13 DIAGNOSIS — I63.511: ICD-10-CM

## 2019-03-13 DIAGNOSIS — I10 HYPERTENSION, ESSENTIAL: Primary | ICD-10-CM

## 2019-03-13 PROCEDURE — 99214 PR OFFICE/OUTPT VISIT, EST, LEVL IV, 30-39 MIN: ICD-10-PCS | Mod: S$PBB,,, | Performed by: FAMILY MEDICINE

## 2019-03-13 PROCEDURE — 99213 OFFICE O/P EST LOW 20 MIN: CPT | Mod: PBBFAC | Performed by: FAMILY MEDICINE

## 2019-03-13 PROCEDURE — 99999 PR PBB SHADOW E&M-EST. PATIENT-LVL III: ICD-10-PCS | Mod: PBBFAC,,, | Performed by: FAMILY MEDICINE

## 2019-03-13 PROCEDURE — 99999 PR PBB SHADOW E&M-EST. PATIENT-LVL III: CPT | Mod: PBBFAC,,, | Performed by: FAMILY MEDICINE

## 2019-03-13 PROCEDURE — 99214 OFFICE O/P EST MOD 30 MIN: CPT | Mod: S$PBB,,, | Performed by: FAMILY MEDICINE

## 2019-03-13 RX ORDER — AMLODIPINE BESYLATE 10 MG/1
10 TABLET ORAL NIGHTLY
Qty: 90 TABLET | Refills: 3 | Status: SHIPPED | OUTPATIENT
Start: 2019-03-13 | End: 2020-03-10 | Stop reason: SDUPTHER

## 2019-03-13 NOTE — PROGRESS NOTES
Subjective:       Patient ID: Devika Ashby is a 64 y.o. female.    Chief Complaint: Medication Refill    Established patient for an annual wellness check/physical exam and also chronic disease management. Specific complaints - see dictation and please see ROS.  P, S, Fm, Soc Hx's; Meds, allergies reviewed and reconciled.  Health maintenance file reviewed and addressed items due.        Review of Systems   Constitutional: Positive for fatigue. Negative for chills, fever and unexpected weight change.   HENT: Positive for congestion. Negative for trouble swallowing.    Eyes: Negative for redness and visual disturbance.   Respiratory: Negative for cough, chest tightness and shortness of breath.    Cardiovascular: Negative for chest pain, palpitations and leg swelling.   Gastrointestinal: Negative for abdominal pain and blood in stool.   Genitourinary: Negative for difficulty urinating, hematuria and menstrual problem.   Musculoskeletal: Positive for arthralgias and myalgias. Negative for back pain, gait problem, joint swelling and neck pain.   Skin: Negative for color change and rash.   Neurological: Positive for weakness. Negative for tremors, speech difficulty, numbness and headaches.   Hematological: Negative for adenopathy. Does not bruise/bleed easily.   Psychiatric/Behavioral: Negative for behavioral problems, confusion and sleep disturbance. The patient is not nervous/anxious.        Objective:      Physical Exam   Constitutional: She is oriented to person, place, and time. She appears well-developed and well-nourished. No distress.   HENT:   Head: Normocephalic.   Right Ear: Tympanic membrane, external ear and ear canal normal.   Left Ear: Tympanic membrane, external ear and ear canal normal.   Nose: Nose normal.   Mouth/Throat: Oropharynx is clear and moist. No oropharyngeal exudate.   Eyes: Conjunctivae are normal. Right eye exhibits no discharge. Left eye exhibits no discharge. No scleral icterus.   Neck:  Normal range of motion. Neck supple. No thyromegaly present.   Cardiovascular: Normal rate, regular rhythm, normal heart sounds and intact distal pulses. Exam reveals no gallop and no friction rub.   No murmur heard.  Pulses:       Dorsalis pedis pulses are 0 on the right side, and 0 on the left side.   Pulmonary/Chest: Effort normal and breath sounds normal. No respiratory distress. She has no wheezes. She has no rales. She exhibits no tenderness.   Abdominal: Soft. There is no tenderness.   Musculoskeletal: She exhibits no edema.        Right foot: There is no deformity.        Left foot: There is no deformity.   Feet:   Right Foot:   Protective Sensation: 3 sites tested. 3 sites sensed.   Skin Integrity: Negative for skin breakdown.   Left Foot:   Protective Sensation: 3 sites tested. 3 sites sensed.   Skin Integrity: Negative for skin breakdown.   Lymphadenopathy:     She has no cervical adenopathy.   Neurological: She is alert and oriented to person, place, and time.   Skin: Skin is warm and dry. No rash noted. She is not diaphoretic.   Nursing note and vitals reviewed.      Assessment:       1. Hypertension, essential    2. Hyperlipidemia, unspecified hyperlipidemia type    3. Uncontrolled type 2 diabetes mellitus with hyperglycemia    4. Type 2 diabetes mellitus with complication, without long-term current use of insulin    5. Cerebrovascular accident (CVA) involving right middle cerebral artery territory    6. Cerebrovascular accident (CVA) due to embolism of precerebral artery    7. Noncompliance by refusing intervention or support    8. Refusal of statin medication by patient    9. Colonoscopy refused    10. Mammogram declined    11. Pneumococcal vaccine refused    12. Refused assessment of physical health    13. Refuses hypertension monitoring    14. Medication refused        Plan:   Devika was seen today for medication refill.    Diagnoses and all orders for this visit:    Hypertension,  essential    Hyperlipidemia, unspecified hyperlipidemia type    Uncontrolled type 2 diabetes mellitus with hyperglycemia    Type 2 diabetes mellitus with complication, without long-term current use of insulin    Cerebrovascular accident (CVA) involving right middle cerebral artery territory    Cerebrovascular accident (CVA) due to embolism of precerebral artery    Noncompliance by refusing intervention or support  Comments:  includes (but not limited to) DM (glycemic, eye, foot, renal), HTN, dyslipidemia care; and ALL recommended cancer (MMG, Cscope) and preventive (DXA)    Refusal of statin medication by patient    Colonoscopy refused    Mammogram declined    Pneumococcal vaccine refused    Refused assessment of physical health    Refuses hypertension monitoring    Medication refused  Comments:  meds beyond norvasc, ALL DM meds, ALL lipid meds    Other orders  -     amLODIPine (NORVASC) 10 MG tablet; Take 1 tablet (10 mg total) by mouth every evening.      See meds, orders, follow up, routing and instructions sections of encounter.  The patient is in for followup and annual physical examination.  She has no   acute complaints at this time.  She is hypertensive once again.  Her A1c was   11.3.  She had an alkaline phosphatase of 150.  Her LDL was 208 with an HDL of   37 and a total cholesterol of 309.    The patient had a CVA in 2016.  She also had a loop recorder placed for a   possible history of atrial fibrillation.    A repeat blood pressure was 142/80.    I performed a cohort calculation, but had to reduce the LDL level to 189 to fit   the calculator.  This resulted in a 26% 10-year cohort risk.    I thoroughly discussed all aspects of the patient's care including her diabetic,   hyperlipidemic, hypertensive care and additionally an extremely excessive risk   of the following:  Stroke, heart attack, vascular disease requiring amputation,   renal failure, blindness, other neurologic complications, other  "cardiovascular   complications, permanent disability, loss of limb and loss of life.  The patient   was given options for diabetic treatment including medications and referrals;   workup of her elevated alkaline phosphatase; antihyperlipidemic and   antihypertensive care including medications, further testing, etc.  The patient   declined all of the above.  She stated that she was aware of the risk, accepted   the risk and was not willing to take any new medications.  With each step of   this process, she stated she had some sort of intolerance.  She was also   counseled that there are multiple new medications out on the market and she   stated she had problems with all of the statins, all of the insulins, etc., etc.   in the past.  She is only willing to take Norvasc and an aspirin daily at this   time.    I asked that if she had any family.  She states that she was  and had   children.  When asked how many, she states "plenty."  She made mention of two   daughters that were nurses.  She states that they get on her about her health as   well.  She would not answer questions, specifically about how many children she   had or any other details of her personal or family life.    I discussed other screening examinations including, but not limited to   colonoscopy, colon and rectal cancer screening, non-colonoscopy, breast cancer   screening ? mammography, OB/GYN referral, GYN cancer screening.    Other screening including DEXA, hepatitis C, etc.  The patient declined all of   the above.  I explained the risk of undiagnosed cancer if we are not allowed to   screen her.  She stated that she was not interested in screening and accepted   that risk.  I discussed with her followup plan.  She stated she would come back   in six months, continue the Norvasc and aspirin, but was unwilling to take any   other medications or treatments at this time.  To reiterate, all loss of limb,   life, livelihood, blindness, renal " failure, cardiovascular disease, stroke,   heart attack, etc., were discussed and mentioned to the patient as specific side   effects and additionally potentially screenable issues such as osteoporosis,   fractures and cancers.  The patient again reiterated the DNR status and states   that she understood the consequences.      MARISA/HN  dd: 03/13/2019 12:39:01 (CDT)  td: 03/14/2019 03:50:49 (CDT)  Doc ID   #[id number]  Job ID #[id number]    CC:     [id number]   54.4

## 2019-07-27 ENCOUNTER — PATIENT MESSAGE (OUTPATIENT)
Dept: INTERNAL MEDICINE | Facility: CLINIC | Age: 64
End: 2019-07-27

## 2019-07-29 ENCOUNTER — PATIENT MESSAGE (OUTPATIENT)
Dept: INTERNAL MEDICINE | Facility: CLINIC | Age: 64
End: 2019-07-29

## 2019-07-29 RX ORDER — MUPIROCIN 20 MG/G
OINTMENT TOPICAL 3 TIMES DAILY
Qty: 22 G | Refills: 1 | Status: SHIPPED | OUTPATIENT
Start: 2019-07-29

## 2019-10-04 DIAGNOSIS — Z12.11 COLON CANCER SCREENING: ICD-10-CM

## 2020-03-09 ENCOUNTER — PATIENT MESSAGE (OUTPATIENT)
Dept: INTERNAL MEDICINE | Facility: CLINIC | Age: 65
End: 2020-03-09

## 2020-03-09 DIAGNOSIS — I10 HYPERTENSION, ESSENTIAL: Primary | ICD-10-CM

## 2020-03-13 ENCOUNTER — PATIENT OUTREACH (OUTPATIENT)
Dept: ADMINISTRATIVE | Facility: HOSPITAL | Age: 65
End: 2020-03-13

## 2020-03-13 DIAGNOSIS — E11.40 TYPE 2 DIABETES MELLITUS WITH DIABETIC NEUROPATHY, WITHOUT LONG-TERM CURRENT USE OF INSULIN: Primary | ICD-10-CM

## 2020-03-13 DIAGNOSIS — E78.5 HYPERLIPIDEMIA, UNSPECIFIED HYPERLIPIDEMIA TYPE: ICD-10-CM

## 2020-03-13 DIAGNOSIS — M81.0 OSTEOPOROSIS, UNSPECIFIED OSTEOPOROSIS TYPE, UNSPECIFIED PATHOLOGICAL FRACTURE PRESENCE: ICD-10-CM

## 2020-03-13 RX ORDER — AMLODIPINE BESYLATE 10 MG/1
10 TABLET ORAL NIGHTLY
Qty: 90 TABLET | Refills: 3 | Status: CANCELLED | OUTPATIENT
Start: 2020-03-13

## 2020-03-13 RX ORDER — AMLODIPINE BESYLATE 10 MG/1
10 TABLET ORAL NIGHTLY
Qty: 90 TABLET | Refills: 0 | Status: SHIPPED | OUTPATIENT
Start: 2020-03-13 | End: 2020-03-27 | Stop reason: SDUPTHER

## 2020-03-13 NOTE — TELEPHONE ENCOUNTER
Refill Authorization Note     is requesting a refill authorization.    Brief assessment and rationale for refill: APPROVE: prr          Medication Therapy Plan: FOVS; approve 3 more    Medication reconciliation completed: No                         Comments:   Requested Prescriptions   Pending Prescriptions Disp Refills    amLODIPine (NORVASC) 10 MG tablet 90 tablet 0     Sig: Take 1 tablet (10 mg total) by mouth every evening.       Cardiovascular:  Calcium Channel Blockers Failed - 3/10/2020  8:33 AM        Failed - Last BP in normal range within 360 days.     BP Readings from Last 3 Encounters:   03/13/19 (!) 142/80   03/14/18 (!) 150/70   08/21/17 (!) 142/76              Passed - Patient is at least 18 years old        Passed - Office visit in past 12 months or future 90 days.     Recent Outpatient Visits            1 year ago Hypertension, essential    [facility] Jeff Ayoub MD    2 years ago Uncontrolled type 2 diabetes mellitus with complication, without long-term current use of insulin    [facility] Jeff Ayoub MD    2 years ago Uncontrolled type 2 diabetes mellitus with complication, without long-term current use of insulin    [facility] - Internal Evelio Ayoub MD    2 years ago Cerebrovascular accident (CVA) involving right middle cerebral artery territory    [facility] Ar[facility]ia Gt Oliver MD    3 years ago Embolic stroke    [facility] Gt Oliver MD          Future Appointments              In 2 weeks MD [facility] - Internal Medicine, [facility]                 Appointments  past 12m or future 3m with PCP    Date Provider   Last Visit   3/13/2019 Jeff Ayoub MD   Next Visit   3/27/2020 Jeff Ayoub MD          Note composed:9:49 AM 03/13/2020

## 2020-03-27 ENCOUNTER — TELEPHONE (OUTPATIENT)
Dept: INTERNAL MEDICINE | Facility: CLINIC | Age: 65
End: 2020-03-27

## 2020-03-27 DIAGNOSIS — Z53.20 REFUSES HYPERTENSION MONITORING: ICD-10-CM

## 2020-03-27 DIAGNOSIS — Z53.20 MEDICATION REFUSED: ICD-10-CM

## 2020-03-27 DIAGNOSIS — E11.40 TYPE 2 DIABETES MELLITUS WITH DIABETIC NEUROPATHY, WITHOUT LONG-TERM CURRENT USE OF INSULIN: ICD-10-CM

## 2020-03-27 DIAGNOSIS — I10 HYPERTENSION, ESSENTIAL: Primary | ICD-10-CM

## 2020-03-27 DIAGNOSIS — Z91.199 NONCOMPLIANCE BY REFUSING INTERVENTION OR SUPPORT: ICD-10-CM

## 2020-03-27 DIAGNOSIS — E78.5 HYPERLIPIDEMIA, UNSPECIFIED HYPERLIPIDEMIA TYPE: ICD-10-CM

## 2020-03-27 DIAGNOSIS — E11.8 TYPE 2 DIABETES MELLITUS WITH COMPLICATION, WITHOUT LONG-TERM CURRENT USE OF INSULIN: ICD-10-CM

## 2020-03-27 RX ORDER — AMLODIPINE BESYLATE 10 MG/1
10 TABLET ORAL NIGHTLY
Qty: 90 TABLET | Refills: 0 | Status: SHIPPED | OUTPATIENT
Start: 2020-03-27 | End: 2020-06-18 | Stop reason: SDUPTHER

## 2020-03-27 NOTE — TELEPHONE ENCOUNTER
Patient due for annual physical examination today.  Will reschedule due to stay at home orders from Governor.  She has no complaints at this time.  She has refused essentially all care in the past except for blood pressure medication.  She is not interested in any diabetic treatment at this time.  Her consequences have been discussed in the past and mentioned again today.  Reschedule appointment with labs in 2 months.  Blood pressure refill.  No acute complaints today.

## 2020-06-02 ENCOUNTER — TELEPHONE (OUTPATIENT)
Dept: INTERNAL MEDICINE | Facility: CLINIC | Age: 65
End: 2020-06-02

## 2020-06-02 ENCOUNTER — OFFICE VISIT (OUTPATIENT)
Dept: INTERNAL MEDICINE | Facility: CLINIC | Age: 65
End: 2020-06-02
Attending: FAMILY MEDICINE
Payer: MEDICARE

## 2020-06-02 VITALS
HEART RATE: 108 BPM | BODY MASS INDEX: 22.31 KG/M2 | WEIGHT: 121.25 LBS | SYSTOLIC BLOOD PRESSURE: 134 MMHG | DIASTOLIC BLOOD PRESSURE: 72 MMHG | OXYGEN SATURATION: 99 % | HEIGHT: 62 IN

## 2020-06-02 DIAGNOSIS — E78.5 HYPERLIPIDEMIA, UNSPECIFIED HYPERLIPIDEMIA TYPE: ICD-10-CM

## 2020-06-02 DIAGNOSIS — Z00.00 ANNUAL PHYSICAL EXAM: Primary | ICD-10-CM

## 2020-06-02 DIAGNOSIS — Z91.199 NONCOMPLIANCE BY REFUSING INTERVENTION OR SUPPORT: ICD-10-CM

## 2020-06-02 DIAGNOSIS — I10 HYPERTENSION, ESSENTIAL: ICD-10-CM

## 2020-06-02 DIAGNOSIS — Z78.9 STATIN INTOLERANCE: ICD-10-CM

## 2020-06-02 DIAGNOSIS — I63.511: ICD-10-CM

## 2020-06-02 DIAGNOSIS — Z53.20 MEDICATION REFUSED: ICD-10-CM

## 2020-06-02 DIAGNOSIS — Z53.20 MAMMOGRAM DECLINED: ICD-10-CM

## 2020-06-02 DIAGNOSIS — Z53.20 REFUSED ASSESSMENT OF PHYSICAL HEALTH: ICD-10-CM

## 2020-06-02 DIAGNOSIS — E11.40 TYPE 2 DIABETES MELLITUS WITH DIABETIC NEUROPATHY, WITHOUT LONG-TERM CURRENT USE OF INSULIN: Primary | ICD-10-CM

## 2020-06-02 DIAGNOSIS — Z53.20 REFUSAL OF STATIN MEDICATION BY PATIENT: ICD-10-CM

## 2020-06-02 DIAGNOSIS — Z53.20 REFUSES HYPERTENSION MONITORING: ICD-10-CM

## 2020-06-02 DIAGNOSIS — E11.40 TYPE 2 DIABETES MELLITUS WITH DIABETIC NEUROPATHY, WITHOUT LONG-TERM CURRENT USE OF INSULIN: ICD-10-CM

## 2020-06-02 DIAGNOSIS — Z53.20 COLONOSCOPY REFUSED: ICD-10-CM

## 2020-06-02 DIAGNOSIS — E11.8 TYPE 2 DIABETES MELLITUS WITH COMPLICATION, WITHOUT LONG-TERM CURRENT USE OF INSULIN: ICD-10-CM

## 2020-06-02 PROCEDURE — 99214 OFFICE O/P EST MOD 30 MIN: CPT | Mod: S$PBB,,, | Performed by: FAMILY MEDICINE

## 2020-06-02 PROCEDURE — 99214 PR OFFICE/OUTPT VISIT, EST, LEVL IV, 30-39 MIN: ICD-10-PCS | Mod: S$PBB,,, | Performed by: FAMILY MEDICINE

## 2020-06-02 PROCEDURE — 99999 PR PBB SHADOW E&M-EST. PATIENT-LVL IV: ICD-10-PCS | Mod: PBBFAC,,, | Performed by: FAMILY MEDICINE

## 2020-06-02 PROCEDURE — 99214 OFFICE O/P EST MOD 30 MIN: CPT | Mod: PBBFAC | Performed by: FAMILY MEDICINE

## 2020-06-02 PROCEDURE — 99999 PR PBB SHADOW E&M-EST. PATIENT-LVL IV: CPT | Mod: PBBFAC,,, | Performed by: FAMILY MEDICINE

## 2020-06-02 NOTE — PROGRESS NOTES
Subjective:       Patient ID: Devika Ashby is a 65 y.o. female.    Chief Complaint: Annual Exam    Established patient follows up for management of chronic medical illnesses with complaints today. Please see dictation and ROS for interval problems, specific complaints and disease management discussion.  Refuses all care except refill of hypertension medications.  She does not admit to any symptoms of decompensated medical illnesses at this time, however relates that her blood sugars have been running significantly high.  She refuses referral to Endocrinology or consideration for any diabetic medications at this time.    P, S, Fm, Soc Hx's; Meds, allergies reviewed and reconciled.  Health maintenance file reviewed and addressed items due.        Once again - I went over her medical conditions and health maintenance recommendations, medications, allergies, etc. in a thorough manner.  The patient refuses ALL care at this time except refill of her Flonase and Norvasc.  I pointed out to her that she had a stroke, uncontrolled diabetes, uncontrolled hyperlipidemia, rising creatinine, unknown blood sugar and metabolic status. Unkown cancer status.    She states that she has had side effects from ALL medications.  I stated that there were other medication choices available for all of her known conditions. I explained the risk of eyesight loss, limb loss, MI, infirmity and dependency, stroke, renal failure and dialysis dependency, cancer, etc.     In summary, she refused a mammogram, DXA.  She refused colonoscopy and gyn care. Refused referral to Endocrinology and other specialists including cariology and ophthalmology, or renewal for her insulin and other DM meds. Refused statin.      I explained the consequences of her decision  including the lethality and high-risk for future cardiovascular events, strokes, neuropathy, amputations, kidney failure, cancer, fractures, etc.  The patient seemed to comprehend this and made  an elective decision to not follow up on any of our recommendations.    Review of Systems   Constitutional: Negative for appetite change, chills, diaphoresis, fatigue and fever.   HENT: Negative for congestion, postnasal drip, rhinorrhea, sore throat and trouble swallowing.    Eyes: Negative for visual disturbance.   Respiratory: Negative for cough, choking, chest tightness, shortness of breath and wheezing.    Cardiovascular: Negative for chest pain and leg swelling.   Gastrointestinal: Negative for abdominal distention, abdominal pain, diarrhea, nausea and vomiting.   Genitourinary: Negative for difficulty urinating.   Musculoskeletal: Negative for arthralgias and myalgias.   Skin: Negative for rash.   Neurological: Negative for weakness, light-headedness and headaches.       Objective:      Physical Exam   Constitutional: She is oriented to person, place, and time. She appears well-developed and well-nourished.   HENT:   Head: Normocephalic and atraumatic.   Eyes: Conjunctivae are normal. No scleral icterus.   Neck: Normal range of motion. Neck supple.   Cardiovascular: Normal rate, normal heart sounds and intact distal pulses. Exam reveals no gallop and no friction rub.   No murmur heard.  Pulmonary/Chest: Effort normal and breath sounds normal. No respiratory distress. She has no wheezes. She has no rales.   Abdominal: She exhibits no distension, no abdominal bruit and no mass. There is no tenderness. There is no guarding.   Musculoskeletal: She exhibits no edema or deformity.   Neurological: She is alert and oriented to person, place, and time. She displays no tremor. No cranial nerve deficit. Coordination and gait normal.   Skin: Skin is warm and dry. No rash noted. She is not diaphoretic. No erythema.   Psychiatric: She has a normal mood and affect. Her speech is normal and behavior is normal. Thought content normal. She expresses inappropriate judgment.   Nursing note and vitals reviewed.      Assessment:  "      1. Type 2 diabetes mellitus with diabetic neuropathy, without long-term current use of insulin    2. Type 2 diabetes mellitus with complication, without long-term current use of insulin    3. Hypertension, essential    4. Hyperlipidemia, unspecified hyperlipidemia type    5. Cerebrovascular accident (CVA) involving right middle cerebral artery territory    6. Colonoscopy refused    7. Mammogram declined    8. Noncompliance by refusing intervention or support    9. Refusal of statin medication by patient    10. Refused assessment of physical health    11. Refuses hypertension monitoring    12. Medication refused    13. Statin intolerance        Plan:     Medication List with Changes/Refills   Current Medications    AMLODIPINE (NORVASC) 10 MG TABLET    Take 1 tablet (10 mg total) by mouth every evening.    ASCORBIC ACID (VITAMIN C) 250 MG TABLET    Take 250 mg by mouth once daily.    ASPIRIN 325 MG TABLET    Take 325 mg by mouth once daily.    B COMPLEX VITAMINS TABLET    Take 1 tablet by mouth once daily.    BLOOD SUGAR DIAGNOSTIC STRP    Use as directed 3-4 times daily to test blood glucose    BLOOD-GLUCOSE METER KIT    Use as directed 3-4 times daily to test blood glucose    CYANOCOBALAMIN (VITAMIN B-12) 100 MCG TABLET    Take 100 mcg by mouth once daily.    DIPHENHYDRAMINE (BENADRYL) 50 MG CAPSULE    Take 1 capsule (50 mg total) by mouth every 6 (six) hours as needed for Allergies.    FLUTICASONE (FLONASE) 50 MCG/ACTUATION NASAL SPRAY    2 sprays by Each Nare route once daily.    IBUPROFEN (ADVIL,MOTRIN) 200 MG TABLET    Take 200 mg by mouth every 6 (six) hours as needed for Pain.    LANCETS MISC    Use as directed 3-4 times daily to test blood glucose    MUPIROCIN (BACTROBAN) 2 % OINTMENT    Apply topically 3 (three) times daily.    OMEPRAZOLE (PRILOSEC) 10 MG CAPSULE    Take 10 mg by mouth once daily.    PEN NEEDLE, DIABETIC 32 GAUGE X 5/32" NDLE    Use as directed 4 times daily to inject insulin    " PYRIDOXINE, VITAMIN B6, (VITAMIN B-6) 100 MG TAB    Take 50 mg by mouth once daily.    SODIUM CHLORIDE (OCEAN) 0.65 % NASAL SPRAY    1 spray by Nasal route as needed (irritation).    TRETINOIN (RETIN-A) 0.05 % CREAM    Apply topically every evening.    VITAMIN E 100 UNIT CAPSULE    Take 100 Units by mouth once daily.     Devika was seen today for annual exam.    Diagnoses and all orders for this visit:    Type 2 diabetes mellitus with diabetic neuropathy, without long-term current use of insulin  -     Hemoglobin A1C; Future  -     Comprehensive metabolic panel; Future  -     Lipid Panel; Future  -     Microalbumin/creatinine urine ratio; Future    Type 2 diabetes mellitus with complication, without long-term current use of insulin    Hypertension, essential    Hyperlipidemia, unspecified hyperlipidemia type  -     Lipid Panel; Future    Cerebrovascular accident (CVA) involving right middle cerebral artery territory  -     CBC Without Differential; Future    Colonoscopy refused    Mammogram declined    Noncompliance by refusing intervention or support    Refusal of statin medication by patient    Refused assessment of physical health    Refuses hypertension monitoring    Medication refused    Statin intolerance  Comments:  refuses medications      See meds, orders, follow up, routing and instructions sections of encounter and AVS. Discussed with patient and provided on AVS.    Diabetes Management Status    Statin: Not taking  ACE/ARB: Not taking    Screening or Prevention Patient's value Goal Complete/Controlled?   HgA1C Testing and Control   Lab Results   Component Value Date    HGBA1C 11.3 (H) 03/04/2019      Annually/Less than 8% No   Lipid profile : 03/04/2019 Annually No   LDL control Lab Results   Component Value Date    LDLCALC 208.8 (H) 03/04/2019    Annually/Less than 100 mg/dl  No   Nephropathy screening Lab Results   Component Value Date    LABMICR 11.0 03/04/2019     Lab Results   Component Value Date     PROTEINUA Negative 04/14/2016    Annually No   Blood pressure BP Readings from Last 1 Encounters:   06/02/20 134/72    Less than 140/90 Yes   Dilated retinal exam Most Recent Eye Exam Date: Not Found Annually Yes   Foot exam   : 03/13/2019 Annually Yes

## 2020-06-02 NOTE — TELEPHONE ENCOUNTER
"----- Message from [employer] sent at 6/2/2020  1:44 PM CDT -----  Regarding: Cancellation of Order # [id number]  Order number [id number] for the procedure LIPID PANEL [LAB18] has   been canceled by [employer] [[id number]]. This procedure  was ordered by Jeff Ayoub MD [[id number]] on Jun 2, 2020 for  the patient Devika Ashby [[id number]]. The reason for   cancellation was "Other".    This was a future order.  "

## 2020-06-02 NOTE — TELEPHONE ENCOUNTER
Patient did not go to lab for her labs today.    See lab orders and please call patient to schedule ordered lab(s) - sometime soon. Thank you.

## 2020-06-09 ENCOUNTER — TELEPHONE (OUTPATIENT)
Dept: INTERNAL MEDICINE | Facility: CLINIC | Age: 65
End: 2020-06-09

## 2020-06-09 ENCOUNTER — LAB VISIT (OUTPATIENT)
Dept: LAB | Facility: HOSPITAL | Age: 65
End: 2020-06-09
Attending: FAMILY MEDICINE
Payer: MEDICARE

## 2020-06-09 DIAGNOSIS — E11.40 TYPE 2 DIABETES MELLITUS WITH DIABETIC NEUROPATHY, WITHOUT LONG-TERM CURRENT USE OF INSULIN: ICD-10-CM

## 2020-06-09 DIAGNOSIS — E78.5 HYPERLIPIDEMIA, UNSPECIFIED HYPERLIPIDEMIA TYPE: ICD-10-CM

## 2020-06-09 LAB
CHOLEST SERPL-MCNC: 324 MG/DL (ref 120–199)
CHOLEST/HDLC SERPL: 8.3 {RATIO} (ref 2–5)
ESTIMATED AVG GLUCOSE: ABNORMAL MG/DL (ref 68–131)
HBA1C MFR BLD HPLC: >14 % (ref 4–5.6)
HDLC SERPL-MCNC: 39 MG/DL (ref 40–75)
HDLC SERPL: 12 % (ref 20–50)
LDLC SERPL CALC-MCNC: ABNORMAL MG/DL (ref 63–159)
NONHDLC SERPL-MCNC: 285 MG/DL
TRIGL SERPL-MCNC: 560 MG/DL (ref 30–150)

## 2020-06-09 PROCEDURE — 80053 COMPREHEN METABOLIC PANEL: CPT

## 2020-06-09 PROCEDURE — 80061 LIPID PANEL: CPT

## 2020-06-09 PROCEDURE — 83036 HEMOGLOBIN GLYCOSYLATED A1C: CPT

## 2020-06-09 PROCEDURE — 36415 COLL VENOUS BLD VENIPUNCTURE: CPT

## 2020-06-09 NOTE — TELEPHONE ENCOUNTER
I had a lengthy discussion with the patient but mostly her daughter while the patient was in the background.  During this conversation with the daughter I can hear the patient's screening and arguing.  Discussed with the daughter the critical nature of her blood work and failure to take care of any of these metabolic conditions including but not limited to uncontrolled diabetes with escalating blood sugar and hemoglobin A1c, hyperlipidemia, concerns for cardiovascular disease including but not limited to coronary, stroke, etc..  Daughter is an RN and recognizes what these numbers mean.  I explained that I have recommended in the past again now that patient get treatment for these conditions.  Most recently would recommend going to the emergency room.  Patient flat out refuses.  Daughter essentially unable to make the patient go.  Also offered potential Endocrinology consult.  Patient adamant that she wants to try diet for this.  Maintain she has multiple allergies and cannot take any medications above amlodipine.  She has never been tested for any of this and the history is poor.  Daughter will attempt to work on her to seek care for these conditions.  At this time it seems that the patient is competent to make this decision though does not seem logical.  In other words I do not think she can be involuntarily committed.  I did explain that her current condition could be fatal if untreated.

## 2020-06-09 NOTE — TELEPHONE ENCOUNTER
Pt says she is going to work on her diet. She believes he Neurosurgeon doesn't want her blood sugars lower than 200. Pt also said she has signed a DNR and refuses to take anything to bring her blood sugars down.    ----- Message from Billie Santos sent at 6/9/2020 11:57 AM CDT -----  Contact: self/ [phone]  Patient is returning a phone call.  Who left a message for the patient: dr diaz  Does patient know what this is regarding:  BS -patient states she will not go to the ER.  Comments:

## 2020-06-09 NOTE — TELEPHONE ENCOUNTER
""this is Dr. Ayoub From [facility]. I have a critical lab test value to report to you.  Your blood sugar is extremely high and I am going to request that you go to the emergency room when you get this message.      Our office #[phone]. If you have any questions please give us a call as soon as you get this message.    Thank you"  "

## 2020-06-09 NOTE — TELEPHONE ENCOUNTER
Notified DIANNA Patterson of pt's critical Glucose 506 today because PCP is currently in Virtual visit. Called for pt but she unavailable. Calling for family at this time. Notfied pt's son of Glucose is 506 and he said he will notify the his sister. Explained his mom has psych issues and has been severely non-compliant.  He is concerned she may need psych intervention will speak to his sister.

## 2020-06-09 NOTE — TELEPHONE ENCOUNTER
"Late note  R/T 2p call...    Pt called [facility] stating "I am not going to the Emergency room, tell Dr. Ayoub it's not happening. My neurologist said I am on Life support." I asked what life support and she said she has chips in her head that require extra sugar." "I signed a DNR so Im alright." I asked if I could call her neurologist and she told me, you don't know my neurologist ( It is not in this file ). Pt hung up abruptly.    Pt's daughter called @ 1402 pm today to apologize for mom's behavior and stated Mom has Psych issues. Diagnosed w/ schizophrenia and is so Non-compliant she doesn't know what else to do. Daughter was sounding like she was beginning to cry on the phone and she hung up abruptly also.  "

## 2020-06-10 LAB
ALBUMIN SERPL BCP-MCNC: 4 G/DL (ref 3.5–5.2)
ALP SERPL-CCNC: 131 U/L (ref 55–135)
ALT SERPL W/O P-5'-P-CCNC: 18 U/L (ref 10–44)
ANION GAP SERPL CALC-SCNC: 11 MMOL/L (ref 8–16)
AST SERPL-CCNC: 12 U/L (ref 10–40)
BILIRUB SERPL-MCNC: 0.5 MG/DL (ref 0.1–1)
BUN SERPL-MCNC: 11 MG/DL (ref 8–23)
CALCIUM SERPL-MCNC: 9.8 MG/DL (ref 8.7–10.5)
CHLORIDE SERPL-SCNC: 97 MMOL/L (ref 95–110)
CO2 SERPL-SCNC: 26 MMOL/L (ref 23–29)
CREAT SERPL-MCNC: 1.1 MG/DL (ref 0.5–1.4)
EST. GFR  (AFRICAN AMERICAN): >60 ML/MIN/1.73 M^2
EST. GFR  (NON AFRICAN AMERICAN): 52.1 ML/MIN/1.73 M^2
GLUCOSE SERPL-MCNC: 506 MG/DL (ref 70–110)
POTASSIUM SERPL-SCNC: 4.2 MMOL/L (ref 3.5–5.1)
PROT SERPL-MCNC: 6.8 G/DL (ref 6–8.4)
SODIUM SERPL-SCNC: 134 MMOL/L (ref 136–145)

## 2020-06-12 DIAGNOSIS — E11.40 TYPE 2 DIABETES MELLITUS WITH DIABETIC NEUROPATHY, WITHOUT LONG-TERM CURRENT USE OF INSULIN: ICD-10-CM

## 2020-06-18 ENCOUNTER — PATIENT MESSAGE (OUTPATIENT)
Dept: INTERNAL MEDICINE | Facility: CLINIC | Age: 65
End: 2020-06-18

## 2020-06-18 DIAGNOSIS — I10 HYPERTENSION, ESSENTIAL: ICD-10-CM

## 2020-06-18 RX ORDER — AMLODIPINE BESYLATE 10 MG/1
10 TABLET ORAL NIGHTLY
Qty: 90 TABLET | Refills: 3 | Status: SHIPPED | OUTPATIENT
Start: 2020-06-18

## 2020-06-18 NOTE — TELEPHONE ENCOUNTER
Refill Authorization Note    is requesting a refill authorization.    Brief assessment and rationale for refill: APPROVE: prr               Medication reconciliation completed: No                         Comments:      Requested Prescriptions   Pending Prescriptions Disp Refills    amLODIPine (NORVASC) 10 MG tablet 90 tablet 3     Sig: Take 1 tablet (10 mg total) by mouth every evening.       Cardiovascular:  Calcium Channel Blockers Passed - 6/18/2020 12:06 PM        Passed - Patient is at least 18 years old        Passed - Last BP in normal range within 360 days.     BP Readings from Last 3 Encounters:   06/02/20 134/72   03/13/19 (!) 142/80   03/14/18 (!) 150/70              Passed - Office visit in past 12 months or future 90 days.     Recent Outpatient Visits            2 weeks ago Type 2 diabetes mellitus with diabetic neuropathy, without long-term current use of insulin    [facility] Internal Medicine Jeff Ayoub MD    1 year ago Hypertension, essential    [facility]  Internal Evelio Ayoub MD    2 years ago Uncontrolled type 2 diabetes mellitus with complication, without long-term current use of insulin    [facility] Medicine Jeff Ayoub MD    2 years ago Uncontrolled type 2 diabetes mellitus with complication, without long-term current use of insulin    [facility]  Internal Evelio Ayoub MD    2 years ago Cerebrovascular accident (CVA) involving right middle cerebral artery territory    [facility] - Arrhythmia Gt Oliver MD                        Appointments  past 12m or future 3m with PCP    Date Provider   Last Visit   6/2/2020 Jeff Ayoub MD   Next Visit   Visit date not found Jeff Ayoub MD   ED visits in past 90 days: [unfilled]     Note composed:1:07 PM 06/18/2020

## 2020-07-17 ENCOUNTER — TELEPHONE (OUTPATIENT)
Dept: INTERNAL MEDICINE | Facility: CLINIC | Age: 65
End: 2020-07-17

## 2020-07-17 DIAGNOSIS — E11.8 TYPE 2 DIABETES MELLITUS WITH COMPLICATION, WITHOUT LONG-TERM CURRENT USE OF INSULIN: ICD-10-CM

## 2020-07-17 DIAGNOSIS — E11.40 TYPE 2 DIABETES MELLITUS WITH DIABETIC NEUROPATHY, WITHOUT LONG-TERM CURRENT USE OF INSULIN: Primary | ICD-10-CM

## 2020-07-17 DIAGNOSIS — Z91.199 NONCOMPLIANCE BY REFUSING INTERVENTION OR SUPPORT: ICD-10-CM

## 2020-07-17 DIAGNOSIS — Z71.89 COMPLEX CARE COORDINATION: ICD-10-CM

## 2020-07-17 NOTE — TELEPHONE ENCOUNTER
----- Message from Jeff Ayoub MD sent at 7/11/2020  2:52 PM CDT -----  Sent to results notes pending further workup or patient follow up appt. - patient has been notified of her critical results and refused to go to the emergency room or seek other treatment.  Follow-up letter is sent and results posted to her inbox.  Additional information will be forwarded to patient relations concerning her AMA.

## 2020-07-17 NOTE — TELEPHONE ENCOUNTER
I am placing referral for patient consult to Endocrinology.  She has refused this in the past but I wanted to try again.      Please see referral orders and please call patient to schedule a consult with it Endocrinology. Thank you.

## 2020-07-17 NOTE — TELEPHONE ENCOUNTER
Please see referral orders and please call patient to schedule a consult with complex case management. Thank you.

## 2020-07-27 ENCOUNTER — OUTPATIENT CASE MANAGEMENT (OUTPATIENT)
Dept: ADMINISTRATIVE | Facility: OTHER | Age: 65
End: 2020-07-27

## 2020-07-30 ENCOUNTER — PATIENT MESSAGE (OUTPATIENT)
Dept: ADMINISTRATIVE | Facility: OTHER | Age: 65
End: 2020-07-30

## 2020-08-03 ENCOUNTER — TELEPHONE (OUTPATIENT)
Dept: INTERNAL MEDICINE | Facility: CLINIC | Age: 65
End: 2020-08-03

## 2020-08-03 NOTE — PROGRESS NOTES
8/3/2020-3rd Attempt to complete initial assessment for OPCM. Left message requesting a return call, a letter was sent via pt portal at the time of the 1st attempt. Will close case at this time as several attempts have been made to contact the patient without success.   Misbah Leslie RN  Outpatient Care Management

## 2020-08-03 NOTE — TELEPHONE ENCOUNTER
----- Message from Felipa Johnson MA sent at 8/3/2020 12:15 PM CDT -----  [place]  ----- Message -----  From: Misbah Lelsie RN  Sent: 8/3/2020  11:19 AM CDT  To: [facility] Staff    Good Morning Dr. Ayoub,       My name is Misbah and I work with the Outpatient Case Management team. I received your referral for Ms. Ashby.  I have tried to contact this patient over the past 2 weeks for enrollment and have been unsuccessful. I will be closing her case at this time since I have been unable contact her. I have sent her my contact information and and encouraged her to contact me for assistance. Please don't hesitate to contact me if you need any more assistance with this patient.       Thank you!  Misbah Leslie RN

## 2020-10-01 ENCOUNTER — PATIENT MESSAGE (OUTPATIENT)
Dept: OTHER | Facility: OTHER | Age: 65
End: 2020-10-01

## 2020-10-05 ENCOUNTER — PATIENT MESSAGE (OUTPATIENT)
Dept: ADMINISTRATIVE | Facility: HOSPITAL | Age: 65
End: 2020-10-05

## 2020-10-23 DIAGNOSIS — Z12.11 COLON CANCER SCREENING: ICD-10-CM

## 2020-10-26 ENCOUNTER — TELEPHONE (OUTPATIENT)
Dept: INTERNAL MEDICINE | Facility: CLINIC | Age: 65
End: 2020-10-26

## 2020-10-26 ENCOUNTER — PATIENT MESSAGE (OUTPATIENT)
Dept: INTERNAL MEDICINE | Facility: CLINIC | Age: 65
End: 2020-10-26

## 2020-10-26 NOTE — TELEPHONE ENCOUNTER
Dr. Ayoub, I listened to your voice mail  about my blood sugar I took my sugar a few hours after lunch my meter reads high [phone].  I appreciate your concern but I do not want to go in for a test.  Cannot take the medications.  Please do not call my children again.  I took them off contact list but i have not checked it to see if they remain off. Thank you again.   JOSE ARMANDO FRANZ

## 2020-12-11 ENCOUNTER — PATIENT MESSAGE (OUTPATIENT)
Dept: OTHER | Facility: OTHER | Age: 65
End: 2020-12-11

## 2021-01-04 ENCOUNTER — PATIENT MESSAGE (OUTPATIENT)
Dept: ADMINISTRATIVE | Facility: HOSPITAL | Age: 66
End: 2021-01-04

## 2021-05-13 ENCOUNTER — PES CALL (OUTPATIENT)
Dept: ADMINISTRATIVE | Facility: CLINIC | Age: 66
End: 2021-05-13

## 2021-06-14 NOTE — TELEPHONE ENCOUNTER
Received order for Diabetes education.  Left message for pt to return call to schedule.  Contact information provided.   [FreeTextEntry3] : GM - 3-2\par FMA - 3-7\par PS - 3-1\par L - 3-9\par

## 2021-07-06 ENCOUNTER — PATIENT MESSAGE (OUTPATIENT)
Dept: ADMINISTRATIVE | Facility: HOSPITAL | Age: 66
End: 2021-07-06

## 2021-08-29 ENCOUNTER — PATIENT OUTREACH (OUTPATIENT)
Dept: ADMINISTRATIVE | Facility: HOSPITAL | Age: 66
End: 2021-08-29

## 2021-08-29 ENCOUNTER — PATIENT MESSAGE (OUTPATIENT)
Dept: ADMINISTRATIVE | Facility: HOSPITAL | Age: 66
End: 2021-08-29

## 2021-10-04 ENCOUNTER — PATIENT MESSAGE (OUTPATIENT)
Dept: ADMINISTRATIVE | Facility: HOSPITAL | Age: 66
End: 2021-10-04

## 2021-12-08 ENCOUNTER — PES CALL (OUTPATIENT)
Dept: ADMINISTRATIVE | Facility: CLINIC | Age: 66
End: 2021-12-08
Payer: MEDICARE

## 2021-12-11 ENCOUNTER — HOSPITAL ENCOUNTER (INPATIENT)
Facility: HOSPITAL | Age: 66
LOS: 3 days | Discharge: NURSING, REHAB OR LONG-TERM CARE | DRG: 641 | End: 2021-12-17
Attending: EMERGENCY MEDICINE | Admitting: INTERNAL MEDICINE
Payer: MEDICARE

## 2021-12-11 DIAGNOSIS — Z86.59 HISTORY OF EPISODE OF DEPRESSION: ICD-10-CM

## 2021-12-11 DIAGNOSIS — R07.9 CHEST PAIN: ICD-10-CM

## 2021-12-11 DIAGNOSIS — Z91.148 NONCOMPLIANCE WITH MEDICATION REGIMEN: ICD-10-CM
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
## 2021-12-11 DIAGNOSIS — R53.1 WEAKNESS: ICD-10-CM
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
## 2021-12-11 LAB
ALBUMIN SERPL BCP-MCNC: 3.5 G/DL (ref 3.5–5.2)
ALP SERPL-CCNC: 82 U/L (ref 55–135)
ALT SERPL W/O P-5'-P-CCNC: 13 U/L (ref 10–44)
ANION GAP SERPL CALC-SCNC: 14 MMOL/L (ref 8–16)
APTT BLDCRRT: <21 SEC (ref 21–32)
AST SERPL-CCNC: 19 U/L (ref 10–40)
BASOPHILS # BLD AUTO: 0.03 K/UL (ref 0–0.2)
BASOPHILS NFR BLD: 0.3 % (ref 0–1.9)
BILIRUB SERPL-MCNC: 0.5 MG/DL (ref 0.1–1)
BUN SERPL-MCNC: 23 MG/DL (ref 8–23)
BUN SERPL-MCNC: 31 MG/DL (ref 6–30)
CALCIUM SERPL-MCNC: 9 MG/DL (ref 8.7–10.5)
CHLORIDE SERPL-SCNC: 98 MMOL/L (ref 95–110)
CHLORIDE SERPL-SCNC: 99 MMOL/L (ref 95–110)
CO2 SERPL-SCNC: 24 MMOL/L (ref 23–29)
CREAT SERPL-MCNC: 0.8 MG/DL (ref 0.5–1.4)
CREAT SERPL-MCNC: 0.9 MG/DL (ref 0.5–1.4)
CTP QC/QA: YES
DIFFERENTIAL METHOD: ABNORMAL
EOSINOPHIL # BLD AUTO: 0.2 K/UL (ref 0–0.5)
EOSINOPHIL NFR BLD: 1.7 % (ref 0–8)
ERYTHROCYTE [DISTWIDTH] IN BLOOD BY AUTOMATED COUNT: 13.3 % (ref 11.5–14.5)
EST. GFR  (AFRICAN AMERICAN): >60 ML/MIN/1.73 M^2
EST. GFR  (NON AFRICAN AMERICAN): >60 ML/MIN/1.73 M^2
GLUCOSE SERPL-MCNC: 180 MG/DL (ref 70–110)
GLUCOSE SERPL-MCNC: 184 MG/DL (ref 70–110)
HCT VFR BLD AUTO: 37.8 % (ref 37–48.5)
HCT VFR BLD CALC: 42 %PCV (ref 36–54)
HGB BLD-MCNC: 12.9 G/DL (ref 12–16)
IMM GRANULOCYTES # BLD AUTO: 0.05 K/UL (ref 0–0.04)
IMM GRANULOCYTES NFR BLD AUTO: 0.4 % (ref 0–0.5)
INR PPP: 1 (ref 0.8–1.2)
LYMPHOCYTES # BLD AUTO: 2.2 K/UL (ref 1–4.8)
LYMPHOCYTES NFR BLD: 19 % (ref 18–48)
MCH RBC QN AUTO: 28 PG (ref 27–31)
MCHC RBC AUTO-ENTMCNC: 34.1 G/DL (ref 32–36)
MCV RBC AUTO: 82 FL (ref 82–98)
MONOCYTES # BLD AUTO: 0.5 K/UL (ref 0.3–1)
MONOCYTES NFR BLD: 4.8 % (ref 4–15)
NEUTROPHILS # BLD AUTO: 8.4 K/UL (ref 1.8–7.7)
NEUTROPHILS NFR BLD: 73.8 % (ref 38–73)
NRBC BLD-RTO: 0 /100 WBC
PLATELET # BLD AUTO: 264 K/UL (ref 150–450)
PMV BLD AUTO: 10.7 FL (ref 9.2–12.9)
POC IONIZED CALCIUM: 1.1 MMOL/L (ref 1.06–1.42)
POC TCO2 (MEASURED): 29 MMOL/L (ref 23–29)
POTASSIUM BLD-SCNC: 3.3 MMOL/L (ref 3.5–5.1)
POTASSIUM SERPL-SCNC: 3.3 MMOL/L (ref 3.5–5.1)
PROT SERPL-MCNC: 6.4 G/DL (ref 6–8.4)
PROTHROMBIN TIME: 11.1 SEC (ref 9–12.5)
RBC # BLD AUTO: 4.6 M/UL (ref 4–5.4)
SAMPLE: ABNORMAL
SARS-COV-2 RDRP RESP QL NAA+PROBE: NEGATIVE
SODIUM BLD-SCNC: 137 MMOL/L (ref 136–145)
SODIUM SERPL-SCNC: 137 MMOL/L (ref 136–145)
TROPONIN I SERPL DL<=0.01 NG/ML-MCNC: 0.01 NG/ML (ref 0–0.03)
TSH SERPL DL<=0.005 MIU/L-ACNC: 2.03 UIU/ML (ref 0.4–4)
WBC # BLD AUTO: 11.32 K/UL (ref 3.9–12.7)

## 2021-12-11 PROCEDURE — 99285 PR EMERGENCY DEPT VISIT,LEVEL V: ICD-10-PCS | Mod: CS,,, | Performed by: EMERGENCY MEDICINE

## 2021-12-11 PROCEDURE — 80047 BASIC METABLC PNL IONIZED CA: CPT

## 2021-12-11 PROCEDURE — 99220 PR INITIAL OBSERVATION CARE,LEVL III: ICD-10-PCS | Mod: ,,, | Performed by: PHYSICIAN ASSISTANT

## 2021-12-11 PROCEDURE — 85610 PROTHROMBIN TIME: CPT | Performed by: EMERGENCY MEDICINE

## 2021-12-11 PROCEDURE — 99220 PR INITIAL OBSERVATION CARE,LEVL III: CPT | Mod: ,,, | Performed by: PHYSICIAN ASSISTANT

## 2021-12-11 PROCEDURE — 85730 THROMBOPLASTIN TIME PARTIAL: CPT | Performed by: EMERGENCY MEDICINE

## 2021-12-11 PROCEDURE — 99285 EMERGENCY DEPT VISIT HI MDM: CPT | Mod: CS,,, | Performed by: EMERGENCY MEDICINE

## 2021-12-11 PROCEDURE — 85025 COMPLETE CBC W/AUTO DIFF WBC: CPT | Performed by: EMERGENCY MEDICINE

## 2021-12-11 PROCEDURE — 99285 EMERGENCY DEPT VISIT HI MDM: CPT | Mod: 25

## 2021-12-11 PROCEDURE — U0002 COVID-19 LAB TEST NON-CDC: HCPCS | Performed by: EMERGENCY MEDICINE

## 2021-12-11 PROCEDURE — 80053 COMPREHEN METABOLIC PANEL: CPT | Performed by: EMERGENCY MEDICINE

## 2021-12-11 PROCEDURE — 84484 ASSAY OF TROPONIN QUANT: CPT | Performed by: EMERGENCY MEDICINE

## 2021-12-11 PROCEDURE — 84443 ASSAY THYROID STIM HORMONE: CPT | Performed by: EMERGENCY MEDICINE

## 2021-12-11 PROCEDURE — G0378 HOSPITAL OBSERVATION PER HR: HCPCS

## 2021-12-11 RX ORDER — IBUPROFEN 200 MG
16 TABLET ORAL
Status: DISCONTINUED | OUTPATIENT
Start: 2021-12-11 | End: 2021-12-18 | Stop reason: HOSPADM

## 2021-12-11 RX ORDER — POLYETHYLENE GLYCOL 3350 17 G/17G
17 POWDER, FOR SOLUTION ORAL DAILY PRN
Status: DISCONTINUED | OUTPATIENT
Start: 2021-12-11 | End: 2021-12-18 | Stop reason: HOSPADM

## 2021-12-11 RX ORDER — IBUPROFEN 200 MG
24 TABLET ORAL
Status: DISCONTINUED | OUTPATIENT
Start: 2021-12-11 | End: 2021-12-18 | Stop reason: HOSPADM

## 2021-12-11 RX ORDER — GLUCAGON 1 MG
1 KIT INJECTION
Status: DISCONTINUED | OUTPATIENT
Start: 2021-12-11 | End: 2021-12-18 | Stop reason: HOSPADM

## 2021-12-11 RX ORDER — BISACODYL 10 MG
10 SUPPOSITORY, RECTAL RECTAL DAILY PRN
Status: DISCONTINUED | OUTPATIENT
Start: 2021-12-11 | End: 2021-12-18 | Stop reason: HOSPADM

## 2021-12-11 RX ORDER — SODIUM CHLORIDE 0.9 % (FLUSH) 0.9 %
10 SYRINGE (ML) INJECTION EVERY 8 HOURS PRN
Status: DISCONTINUED | OUTPATIENT
Start: 2021-12-11 | End: 2021-12-18 | Stop reason: HOSPADM

## 2021-12-11 RX ORDER — AMLODIPINE BESYLATE 10 MG/1
10 TABLET ORAL NIGHTLY
Status: DISCONTINUED | OUTPATIENT
Start: 2021-12-12 | End: 2021-12-18 | Stop reason: HOSPADM

## 2021-12-11 RX ORDER — TALC
6 POWDER (GRAM) TOPICAL NIGHTLY PRN
Status: DISCONTINUED | OUTPATIENT
Start: 2021-12-11 | End: 2021-12-18 | Stop reason: HOSPADM

## 2021-12-11 RX ORDER — NALOXONE HCL 0.4 MG/ML
0.02 VIAL (ML) INJECTION
Status: DISCONTINUED | OUTPATIENT
Start: 2021-12-11 | End: 2021-12-18 | Stop reason: HOSPADM

## 2021-12-11 RX ORDER — PANTOPRAZOLE SODIUM 40 MG/1
40 TABLET, DELAYED RELEASE ORAL DAILY
Status: DISCONTINUED | OUTPATIENT
Start: 2021-12-12 | End: 2021-12-18 | Stop reason: HOSPADM

## 2021-12-11 RX ORDER — PROMETHAZINE HYDROCHLORIDE 25 MG/1
25 TABLET ORAL EVERY 6 HOURS PRN
Status: DISCONTINUED | OUTPATIENT
Start: 2021-12-11 | End: 2021-12-18 | Stop reason: HOSPADM

## 2021-12-11 RX ORDER — ONDANSETRON 8 MG/1
8 TABLET, ORALLY DISINTEGRATING ORAL EVERY 8 HOURS PRN
Status: DISCONTINUED | OUTPATIENT
Start: 2021-12-11 | End: 2021-12-18 | Stop reason: HOSPADM

## 2021-12-11 RX ORDER — IPRATROPIUM BROMIDE AND ALBUTEROL SULFATE 2.5; .5 MG/3ML; MG/3ML
3 SOLUTION RESPIRATORY (INHALATION) EVERY 4 HOURS PRN
Status: DISCONTINUED | OUTPATIENT
Start: 2021-12-11 | End: 2021-12-18 | Stop reason: HOSPADM

## 2021-12-11 RX ORDER — INSULIN ASPART 100 [IU]/ML
0-5 INJECTION, SOLUTION INTRAVENOUS; SUBCUTANEOUS
Status: DISCONTINUED | OUTPATIENT
Start: 2021-12-12 | End: 2021-12-18 | Stop reason: HOSPADM

## 2021-12-11 RX ORDER — ACETAMINOPHEN 325 MG/1
650 TABLET ORAL EVERY 4 HOURS PRN
Status: DISCONTINUED | OUTPATIENT
Start: 2021-12-11 | End: 2021-12-18 | Stop reason: HOSPADM

## 2021-12-11 RX ORDER — ENOXAPARIN SODIUM 100 MG/ML
40 INJECTION SUBCUTANEOUS EVERY 24 HOURS
Status: DISCONTINUED | OUTPATIENT
Start: 2021-12-11 | End: 2021-12-12

## 2021-12-12 PROBLEM — F20.0 PARANOID SCHIZOPHRENIA: Status: RESOLVED | Noted: 2021-12-12 | Resolved: 2021-12-12

## 2021-12-12 PROBLEM — R19.7 DIARRHEA: Status: ACTIVE | Noted: 2021-12-12

## 2021-12-12 PROBLEM — E87.6 HYPOKALEMIA: Status: ACTIVE | Noted: 2021-12-12

## 2021-12-12 PROBLEM — F20.0 PARANOID SCHIZOPHRENIA: Status: ACTIVE | Noted: 2021-12-12

## 2021-12-12 LAB
ALBUMIN SERPL BCP-MCNC: 2.9 G/DL (ref 3.5–5.2)
ALP SERPL-CCNC: 70 U/L (ref 55–135)
ALT SERPL W/O P-5'-P-CCNC: 13 U/L (ref 10–44)
ANION GAP SERPL CALC-SCNC: 11 MMOL/L (ref 8–16)
AST SERPL-CCNC: 11 U/L (ref 10–40)
BACTERIA #/AREA URNS AUTO: NORMAL /HPF
BASOPHILS # BLD AUTO: 0.02 K/UL (ref 0–0.2)
BASOPHILS NFR BLD: 0.4 % (ref 0–1.9)
BILIRUB SERPL-MCNC: 0.7 MG/DL (ref 0.1–1)
BILIRUB UR QL STRIP: NEGATIVE
BUN SERPL-MCNC: 16 MG/DL (ref 8–23)
CALCIUM SERPL-MCNC: 8.1 MG/DL (ref 8.7–10.5)
CHLORIDE SERPL-SCNC: 105 MMOL/L (ref 95–110)
CHOLEST SERPL-MCNC: 190 MG/DL (ref 120–199)
CHOLEST/HDLC SERPL: 6.6 {RATIO} (ref 2–5)
CLARITY UR REFRACT.AUTO: CLEAR
CO2 SERPL-SCNC: 25 MMOL/L (ref 23–29)
COLOR UR AUTO: YELLOW
CREAT SERPL-MCNC: 0.7 MG/DL (ref 0.5–1.4)
DIFFERENTIAL METHOD: ABNORMAL
EOSINOPHIL # BLD AUTO: 0.1 K/UL (ref 0–0.5)
EOSINOPHIL NFR BLD: 2.7 % (ref 0–8)
ERYTHROCYTE [DISTWIDTH] IN BLOOD BY AUTOMATED COUNT: 13.4 % (ref 11.5–14.5)
EST. GFR  (AFRICAN AMERICAN): >60 ML/MIN/1.73 M^2
EST. GFR  (NON AFRICAN AMERICAN): >60 ML/MIN/1.73 M^2
GLUCOSE SERPL-MCNC: 146 MG/DL (ref 70–110)
GLUCOSE UR QL STRIP: ABNORMAL
HCT VFR BLD AUTO: 36.6 % (ref 37–48.5)
HDLC SERPL-MCNC: 29 MG/DL (ref 40–75)
HDLC SERPL: 15.3 % (ref 20–50)
HGB BLD-MCNC: 12.6 G/DL (ref 12–16)
HGB UR QL STRIP: NEGATIVE
HYALINE CASTS UR QL AUTO: 1 /LPF
IMM GRANULOCYTES # BLD AUTO: 0.01 K/UL (ref 0–0.04)
IMM GRANULOCYTES NFR BLD AUTO: 0.2 % (ref 0–0.5)
KETONES UR QL STRIP: ABNORMAL
LDLC SERPL CALC-MCNC: 128 MG/DL (ref 63–159)
LEUKOCYTE ESTERASE UR QL STRIP: NEGATIVE
LYMPHOCYTES # BLD AUTO: 1.7 K/UL (ref 1–4.8)
LYMPHOCYTES NFR BLD: 34.1 % (ref 18–48)
MAGNESIUM SERPL-MCNC: 1.7 MG/DL (ref 1.6–2.6)
MCH RBC QN AUTO: 28.4 PG (ref 27–31)
MCHC RBC AUTO-ENTMCNC: 34.4 G/DL (ref 32–36)
MCV RBC AUTO: 82 FL (ref 82–98)
MICROSCOPIC COMMENT: NORMAL
MONOCYTES # BLD AUTO: 0.4 K/UL (ref 0.3–1)
MONOCYTES NFR BLD: 7.8 % (ref 4–15)
NEUTROPHILS # BLD AUTO: 2.7 K/UL (ref 1.8–7.7)
NEUTROPHILS NFR BLD: 54.8 % (ref 38–73)
NITRITE UR QL STRIP: NEGATIVE
NONHDLC SERPL-MCNC: 161 MG/DL
NRBC BLD-RTO: 0 /100 WBC
PH UR STRIP: 6 [PH] (ref 5–8)
PHOSPHATE SERPL-MCNC: 2.6 MG/DL (ref 2.7–4.5)
PLATELET # BLD AUTO: 264 K/UL (ref 150–450)
PMV BLD AUTO: 11.2 FL (ref 9.2–12.9)
POCT GLUCOSE: 133 MG/DL (ref 70–110)
POCT GLUCOSE: 159 MG/DL (ref 70–110)
POCT GLUCOSE: 164 MG/DL (ref 70–110)
POTASSIUM SERPL-SCNC: 3 MMOL/L (ref 3.5–5.1)
PROT SERPL-MCNC: 5.1 G/DL (ref 6–8.4)
PROT UR QL STRIP: ABNORMAL
RBC # BLD AUTO: 4.44 M/UL (ref 4–5.4)
RBC #/AREA URNS AUTO: 1 /HPF (ref 0–4)
SODIUM SERPL-SCNC: 141 MMOL/L (ref 136–145)
SP GR UR STRIP: 1.01 (ref 1–1.03)
SQUAMOUS #/AREA URNS AUTO: 0 /HPF
TRIGL SERPL-MCNC: 165 MG/DL (ref 30–150)
URN SPEC COLLECT METH UR: ABNORMAL
WBC # BLD AUTO: 4.9 K/UL (ref 3.9–12.7)
WBC #/AREA URNS AUTO: 1 /HPF (ref 0–5)
YEAST UR QL AUTO: NORMAL

## 2021-12-12 PROCEDURE — 25000003 PHARM REV CODE 250: Performed by: PHYSICIAN ASSISTANT

## 2021-12-12 PROCEDURE — 83735 ASSAY OF MAGNESIUM: CPT | Performed by: PHYSICIAN ASSISTANT

## 2021-12-12 PROCEDURE — 63600175 PHARM REV CODE 636 W HCPCS: Performed by: PHYSICIAN ASSISTANT

## 2021-12-12 PROCEDURE — G0378 HOSPITAL OBSERVATION PER HR: HCPCS

## 2021-12-12 PROCEDURE — 97166 OT EVAL MOD COMPLEX 45 MIN: CPT

## 2021-12-12 PROCEDURE — 99219 PR INITIAL OBSERVATION CARE,LEVL II: CPT | Mod: GC,,, | Performed by: INTERNAL MEDICINE

## 2021-12-12 PROCEDURE — 84100 ASSAY OF PHOSPHORUS: CPT | Performed by: PHYSICIAN ASSISTANT

## 2021-12-12 PROCEDURE — 36415 COLL VENOUS BLD VENIPUNCTURE: CPT | Performed by: PHYSICIAN ASSISTANT

## 2021-12-12 PROCEDURE — 99226 PR SUBSEQUENT OBSERVATION CARE,LEVEL III: ICD-10-PCS | Mod: ,,, | Performed by: PHYSICIAN ASSISTANT

## 2021-12-12 PROCEDURE — 99219 PR INITIAL OBSERVATION CARE,LEVL II: ICD-10-PCS | Mod: GC,,, | Performed by: INTERNAL MEDICINE

## 2021-12-12 PROCEDURE — 80053 COMPREHEN METABOLIC PANEL: CPT | Performed by: PHYSICIAN ASSISTANT

## 2021-12-12 PROCEDURE — 80061 LIPID PANEL: CPT | Performed by: PHYSICIAN ASSISTANT

## 2021-12-12 PROCEDURE — 99226 PR SUBSEQUENT OBSERVATION CARE,LEVEL III: CPT | Mod: ,,, | Performed by: PHYSICIAN ASSISTANT

## 2021-12-12 PROCEDURE — 97110 THERAPEUTIC EXERCISES: CPT

## 2021-12-12 PROCEDURE — 85025 COMPLETE CBC W/AUTO DIFF WBC: CPT | Performed by: PHYSICIAN ASSISTANT

## 2021-12-12 PROCEDURE — 63600175 PHARM REV CODE 636 W HCPCS: Performed by: INTERNAL MEDICINE

## 2021-12-12 PROCEDURE — 81001 URINALYSIS AUTO W/SCOPE: CPT | Performed by: EMERGENCY MEDICINE

## 2021-12-12 PROCEDURE — 97161 PT EVAL LOW COMPLEX 20 MIN: CPT

## 2021-12-12 PROCEDURE — 97535 SELF CARE MNGMENT TRAINING: CPT

## 2021-12-12 RX ORDER — POTASSIUM CHLORIDE 20 MEQ/1
40 TABLET, EXTENDED RELEASE ORAL EVERY 4 HOURS
Status: COMPLETED | OUTPATIENT
Start: 2021-12-12 | End: 2021-12-12

## 2021-12-12 RX ORDER — SODIUM CHLORIDE 9 MG/ML
INJECTION, SOLUTION INTRAVENOUS CONTINUOUS
Status: ACTIVE | OUTPATIENT
Start: 2021-12-12 | End: 2021-12-12

## 2021-12-12 RX ORDER — ENOXAPARIN SODIUM 100 MG/ML
30 INJECTION SUBCUTANEOUS EVERY 24 HOURS
Status: DISCONTINUED | OUTPATIENT
Start: 2021-12-12 | End: 2021-12-18 | Stop reason: HOSPADM

## 2021-12-12 RX ADMIN — ENOXAPARIN SODIUM 40 MG: 100 INJECTION SUBCUTANEOUS at 01:12

## 2021-12-12 RX ADMIN — POTASSIUM CHLORIDE 40 MEQ: 1500 TABLET, EXTENDED RELEASE ORAL at 02:12

## 2021-12-12 RX ADMIN — AMLODIPINE BESYLATE 10 MG: 10 TABLET ORAL at 08:12

## 2021-12-12 RX ADMIN — POTASSIUM BICARBONATE 25 MEQ: 978 TABLET, EFFERVESCENT ORAL at 01:12

## 2021-12-12 RX ADMIN — SODIUM CHLORIDE: 0.9 INJECTION, SOLUTION INTRAVENOUS at 04:12

## 2021-12-12 RX ADMIN — SODIUM CHLORIDE 1000 ML: 0.9 INJECTION, SOLUTION INTRAVENOUS at 12:12

## 2021-12-12 RX ADMIN — POTASSIUM CHLORIDE 40 MEQ: 1500 TABLET, EXTENDED RELEASE ORAL at 10:12

## 2021-12-12 RX ADMIN — ENOXAPARIN SODIUM 30 MG: 30 INJECTION, SOLUTION INTRAVENOUS; SUBCUTANEOUS at 05:12

## 2021-12-13 PROBLEM — E83.42 HYPOMAGNESEMIA: Status: ACTIVE | Noted: 2021-12-13

## 2021-12-13 PROBLEM — E43 SEVERE MALNUTRITION: Status: ACTIVE | Noted: 2021-12-13

## 2021-12-13 PROBLEM — Z86.59 HISTORY OF EPISODE OF DEPRESSION: Status: ACTIVE | Noted: 2021-12-13

## 2021-12-13 LAB
ALBUMIN SERPL BCP-MCNC: 2.9 G/DL (ref 3.5–5.2)
ALP SERPL-CCNC: 68 U/L (ref 55–135)
ALT SERPL W/O P-5'-P-CCNC: 11 U/L (ref 10–44)
ANION GAP SERPL CALC-SCNC: 13 MMOL/L (ref 8–16)
AST SERPL-CCNC: 12 U/L (ref 10–40)
BASOPHILS # BLD AUTO: 0.03 K/UL (ref 0–0.2)
BASOPHILS NFR BLD: 0.7 % (ref 0–1.9)
BILIRUB SERPL-MCNC: 0.7 MG/DL (ref 0.1–1)
BUN SERPL-MCNC: 17 MG/DL (ref 8–23)
CALCIUM SERPL-MCNC: 8.3 MG/DL (ref 8.7–10.5)
CHLORIDE SERPL-SCNC: 103 MMOL/L (ref 95–110)
CO2 SERPL-SCNC: 21 MMOL/L (ref 23–29)
CREAT SERPL-MCNC: 0.7 MG/DL (ref 0.5–1.4)
DIFFERENTIAL METHOD: ABNORMAL
EOSINOPHIL # BLD AUTO: 0.2 K/UL (ref 0–0.5)
EOSINOPHIL NFR BLD: 3.4 % (ref 0–8)
ERYTHROCYTE [DISTWIDTH] IN BLOOD BY AUTOMATED COUNT: 13.4 % (ref 11.5–14.5)
EST. GFR  (AFRICAN AMERICAN): >60 ML/MIN/1.73 M^2
EST. GFR  (NON AFRICAN AMERICAN): >60 ML/MIN/1.73 M^2
GLUCOSE SERPL-MCNC: 151 MG/DL (ref 70–110)
HCT VFR BLD AUTO: 36.4 % (ref 37–48.5)
HGB BLD-MCNC: 12.4 G/DL (ref 12–16)
IMM GRANULOCYTES # BLD AUTO: 0.01 K/UL (ref 0–0.04)
IMM GRANULOCYTES NFR BLD AUTO: 0.2 % (ref 0–0.5)
LYMPHOCYTES # BLD AUTO: 1.5 K/UL (ref 1–4.8)
LYMPHOCYTES NFR BLD: 35.2 % (ref 18–48)
MAGNESIUM SERPL-MCNC: 1.5 MG/DL (ref 1.6–2.6)
MCH RBC QN AUTO: 28.6 PG (ref 27–31)
MCHC RBC AUTO-ENTMCNC: 34.1 G/DL (ref 32–36)
MCV RBC AUTO: 84 FL (ref 82–98)
MONOCYTES # BLD AUTO: 0.2 K/UL (ref 0.3–1)
MONOCYTES NFR BLD: 5.5 % (ref 4–15)
NEUTROPHILS # BLD AUTO: 2.4 K/UL (ref 1.8–7.7)
NEUTROPHILS NFR BLD: 55 % (ref 38–73)
NRBC BLD-RTO: 0 /100 WBC
PHOSPHATE SERPL-MCNC: 2.8 MG/DL (ref 2.7–4.5)
PLATELET # BLD AUTO: 262 K/UL (ref 150–450)
PMV BLD AUTO: 11.5 FL (ref 9.2–12.9)
POCT GLUCOSE: 142 MG/DL (ref 70–110)
POCT GLUCOSE: 148 MG/DL (ref 70–110)
POCT GLUCOSE: 213 MG/DL (ref 70–110)
POTASSIUM SERPL-SCNC: 3.1 MMOL/L (ref 3.5–5.1)
PROT SERPL-MCNC: 5.2 G/DL (ref 6–8.4)
RBC # BLD AUTO: 4.34 M/UL (ref 4–5.4)
SODIUM SERPL-SCNC: 137 MMOL/L (ref 136–145)
WBC # BLD AUTO: 4.38 K/UL (ref 3.9–12.7)

## 2021-12-13 PROCEDURE — 25000003 PHARM REV CODE 250: Performed by: PHYSICIAN ASSISTANT

## 2021-12-13 PROCEDURE — 93010 EKG 12-LEAD: ICD-10-PCS | Mod: ,,, | Performed by: INTERNAL MEDICINE

## 2021-12-13 PROCEDURE — 93005 ELECTROCARDIOGRAM TRACING: CPT

## 2021-12-13 PROCEDURE — 99225 PR SUBSEQUENT OBSERVATION CARE,LEVEL II: CPT | Mod: ,,, | Performed by: PSYCHIATRY & NEUROLOGY

## 2021-12-13 PROCEDURE — 99226 PR SUBSEQUENT OBSERVATION CARE,LEVEL III: ICD-10-PCS | Mod: ,,, | Performed by: PHYSICIAN ASSISTANT

## 2021-12-13 PROCEDURE — 99226 PR SUBSEQUENT OBSERVATION CARE,LEVEL III: CPT | Mod: ,,, | Performed by: PHYSICIAN ASSISTANT

## 2021-12-13 PROCEDURE — 63600175 PHARM REV CODE 636 W HCPCS: Performed by: INTERNAL MEDICINE

## 2021-12-13 PROCEDURE — 85025 COMPLETE CBC W/AUTO DIFF WBC: CPT | Performed by: PHYSICIAN ASSISTANT

## 2021-12-13 PROCEDURE — 99225 PR SUBSEQUENT OBSERVATION CARE,LEVEL II: ICD-10-PCS | Mod: ,,, | Performed by: PSYCHIATRY & NEUROLOGY

## 2021-12-13 PROCEDURE — 93010 ELECTROCARDIOGRAM REPORT: CPT | Mod: ,,, | Performed by: INTERNAL MEDICINE

## 2021-12-13 PROCEDURE — 84100 ASSAY OF PHOSPHORUS: CPT | Performed by: PHYSICIAN ASSISTANT

## 2021-12-13 PROCEDURE — 94761 N-INVAS EAR/PLS OXIMETRY MLT: CPT

## 2021-12-13 PROCEDURE — 83735 ASSAY OF MAGNESIUM: CPT | Performed by: PHYSICIAN ASSISTANT

## 2021-12-13 PROCEDURE — 36415 COLL VENOUS BLD VENIPUNCTURE: CPT | Performed by: PHYSICIAN ASSISTANT

## 2021-12-13 PROCEDURE — G0378 HOSPITAL OBSERVATION PER HR: HCPCS

## 2021-12-13 PROCEDURE — 80053 COMPREHEN METABOLIC PANEL: CPT | Performed by: PHYSICIAN ASSISTANT

## 2021-12-13 RX ORDER — POTASSIUM CHLORIDE 20 MEQ/1
40 TABLET, EXTENDED RELEASE ORAL EVERY 4 HOURS
Status: COMPLETED | OUTPATIENT
Start: 2021-12-13 | End: 2021-12-13

## 2021-12-13 RX ORDER — MAGNESIUM SULFATE HEPTAHYDRATE 40 MG/ML
2 INJECTION, SOLUTION INTRAVENOUS ONCE
Status: DISCONTINUED | OUTPATIENT
Start: 2021-12-13 | End: 2021-12-13

## 2021-12-13 RX ORDER — LANOLIN ALCOHOL/MO/W.PET/CERES
400 CREAM (GRAM) TOPICAL ONCE
Status: COMPLETED | OUTPATIENT
Start: 2021-12-13 | End: 2021-12-13

## 2021-12-13 RX ADMIN — ENOXAPARIN SODIUM 30 MG: 30 INJECTION, SOLUTION INTRAVENOUS; SUBCUTANEOUS at 04:12

## 2021-12-13 RX ADMIN — AMLODIPINE BESYLATE 10 MG: 10 TABLET ORAL at 09:12

## 2021-12-13 RX ADMIN — POTASSIUM CHLORIDE 40 MEQ: 1500 TABLET, EXTENDED RELEASE ORAL at 08:12

## 2021-12-13 RX ADMIN — POTASSIUM CHLORIDE 40 MEQ: 1500 TABLET, EXTENDED RELEASE ORAL at 11:12

## 2021-12-13 RX ADMIN — Medication 400 MG: at 11:12

## 2021-12-14 LAB
ALBUMIN SERPL BCP-MCNC: 3 G/DL (ref 3.5–5.2)
ALP SERPL-CCNC: 66 U/L (ref 55–135)
ALT SERPL W/O P-5'-P-CCNC: 12 U/L (ref 10–44)
ANION GAP SERPL CALC-SCNC: 11 MMOL/L (ref 8–16)
AST SERPL-CCNC: 10 U/L (ref 10–40)
BASOPHILS # BLD AUTO: 0.03 K/UL (ref 0–0.2)
BASOPHILS NFR BLD: 0.6 % (ref 0–1.9)
BILIRUB SERPL-MCNC: 0.6 MG/DL (ref 0.1–1)
BUN SERPL-MCNC: 15 MG/DL (ref 8–23)
CALCIUM SERPL-MCNC: 8.8 MG/DL (ref 8.7–10.5)
CHLORIDE SERPL-SCNC: 102 MMOL/L (ref 95–110)
CO2 SERPL-SCNC: 22 MMOL/L (ref 23–29)
CREAT SERPL-MCNC: 0.7 MG/DL (ref 0.5–1.4)
DIFFERENTIAL METHOD: NORMAL
EOSINOPHIL # BLD AUTO: 0.1 K/UL (ref 0–0.5)
EOSINOPHIL NFR BLD: 2.7 % (ref 0–8)
ERYTHROCYTE [DISTWIDTH] IN BLOOD BY AUTOMATED COUNT: 13.6 % (ref 11.5–14.5)
EST. GFR  (AFRICAN AMERICAN): >60 ML/MIN/1.73 M^2
EST. GFR  (NON AFRICAN AMERICAN): >60 ML/MIN/1.73 M^2
GLUCOSE SERPL-MCNC: 221 MG/DL (ref 70–110)
GLUCOSE SERPL-MCNC: 225 MG/DL (ref 70–110)
HCT VFR BLD AUTO: 38.5 % (ref 37–48.5)
HGB BLD-MCNC: 13.1 G/DL (ref 12–16)
IMM GRANULOCYTES # BLD AUTO: 0.02 K/UL (ref 0–0.04)
IMM GRANULOCYTES NFR BLD AUTO: 0.4 % (ref 0–0.5)
LYMPHOCYTES # BLD AUTO: 1.5 K/UL (ref 1–4.8)
LYMPHOCYTES NFR BLD: 29 % (ref 18–48)
MAGNESIUM SERPL-MCNC: 1.7 MG/DL (ref 1.6–2.6)
MCH RBC QN AUTO: 28.2 PG (ref 27–31)
MCHC RBC AUTO-ENTMCNC: 34 G/DL (ref 32–36)
MCV RBC AUTO: 83 FL (ref 82–98)
MONOCYTES # BLD AUTO: 0.3 K/UL (ref 0.3–1)
MONOCYTES NFR BLD: 6.6 % (ref 4–15)
NEUTROPHILS # BLD AUTO: 3.1 K/UL (ref 1.8–7.7)
NEUTROPHILS NFR BLD: 60.7 % (ref 38–73)
NRBC BLD-RTO: 0 /100 WBC
PHOSPHATE SERPL-MCNC: 2.5 MG/DL (ref 2.7–4.5)
PLATELET # BLD AUTO: 277 K/UL (ref 150–450)
PMV BLD AUTO: 11.1 FL (ref 9.2–12.9)
POCT GLUCOSE: 182 MG/DL (ref 70–110)
POCT GLUCOSE: 203 MG/DL (ref 70–110)
POCT GLUCOSE: 205 MG/DL (ref 70–110)
POCT GLUCOSE: 221 MG/DL (ref 70–110)
POTASSIUM SERPL-SCNC: 4.2 MMOL/L (ref 3.5–5.1)
PROT SERPL-MCNC: 5.6 G/DL (ref 6–8.4)
RBC # BLD AUTO: 4.64 M/UL (ref 4–5.4)
SODIUM SERPL-SCNC: 135 MMOL/L (ref 136–145)
WBC # BLD AUTO: 5.13 K/UL (ref 3.9–12.7)

## 2021-12-14 PROCEDURE — 97110 THERAPEUTIC EXERCISES: CPT

## 2021-12-14 PROCEDURE — 63600175 PHARM REV CODE 636 W HCPCS: Performed by: INTERNAL MEDICINE

## 2021-12-14 PROCEDURE — 99233 SBSQ HOSP IP/OBS HIGH 50: CPT | Mod: ,,, | Performed by: PHYSICIAN ASSISTANT

## 2021-12-14 PROCEDURE — 80053 COMPREHEN METABOLIC PANEL: CPT | Performed by: PHYSICIAN ASSISTANT

## 2021-12-14 PROCEDURE — 36415 COLL VENOUS BLD VENIPUNCTURE: CPT | Performed by: PHYSICIAN ASSISTANT

## 2021-12-14 PROCEDURE — 85025 COMPLETE CBC W/AUTO DIFF WBC: CPT | Performed by: PHYSICIAN ASSISTANT

## 2021-12-14 PROCEDURE — 25000003 PHARM REV CODE 250: Performed by: PHYSICIAN ASSISTANT

## 2021-12-14 PROCEDURE — 97535 SELF CARE MNGMENT TRAINING: CPT

## 2021-12-14 PROCEDURE — 11000001 HC ACUTE MED/SURG PRIVATE ROOM

## 2021-12-14 PROCEDURE — 99233 PR SUBSEQUENT HOSPITAL CARE,LEVL III: ICD-10-PCS | Mod: ,,, | Performed by: PHYSICIAN ASSISTANT

## 2021-12-14 PROCEDURE — 25000003 PHARM REV CODE 250: Performed by: NURSE PRACTITIONER

## 2021-12-14 PROCEDURE — 84100 ASSAY OF PHOSPHORUS: CPT | Performed by: PHYSICIAN ASSISTANT

## 2021-12-14 PROCEDURE — 83735 ASSAY OF MAGNESIUM: CPT | Performed by: PHYSICIAN ASSISTANT

## 2021-12-14 RX ORDER — LANOLIN ALCOHOL/MO/W.PET/CERES
800 CREAM (GRAM) TOPICAL 2 TIMES DAILY
Status: DISCONTINUED | OUTPATIENT
Start: 2021-12-14 | End: 2021-12-14

## 2021-12-14 RX ORDER — IBUPROFEN 600 MG/1
600 TABLET ORAL ONCE
Status: DISCONTINUED | OUTPATIENT
Start: 2021-12-14 | End: 2021-12-18 | Stop reason: HOSPADM

## 2021-12-14 RX ORDER — LANOLIN ALCOHOL/MO/W.PET/CERES
800 CREAM (GRAM) TOPICAL 2 TIMES DAILY
Status: DISPENSED | OUTPATIENT
Start: 2021-12-14 | End: 2021-12-16

## 2021-12-14 RX ADMIN — Medication 800 MG: at 12:12

## 2021-12-14 RX ADMIN — ENOXAPARIN SODIUM 30 MG: 30 INJECTION, SOLUTION INTRAVENOUS; SUBCUTANEOUS at 05:12

## 2021-12-14 RX ADMIN — Medication 800 MG: at 10:12

## 2021-12-14 RX ADMIN — AMLODIPINE BESYLATE 10 MG: 10 TABLET ORAL at 10:12

## 2021-12-15 PROBLEM — R53.81 DEBILITY: Status: RESOLVED | Noted: 2021-12-11 | Resolved: 2021-12-15

## 2021-12-15 PROBLEM — R53.81 DEBILITY: Status: ACTIVE | Noted: 2021-12-11

## 2021-12-15 PROBLEM — R54 AGE-RELATED PHYSICAL DEBILITY: Status: ACTIVE | Noted: 2021-12-11

## 2021-12-15 LAB
CRP SERPL-MCNC: 10.1 MG/L (ref 0–8.2)
ERYTHROCYTE [SEDIMENTATION RATE] IN BLOOD BY WESTERGREN METHOD: 18 MM/HR (ref 0–36)
URATE SERPL-MCNC: 2.8 MG/DL (ref 2.4–5.7)

## 2021-12-15 PROCEDURE — 84550 ASSAY OF BLOOD/URIC ACID: CPT | Performed by: PHYSICIAN ASSISTANT

## 2021-12-15 PROCEDURE — 25000003 PHARM REV CODE 250: Performed by: PHYSICIAN ASSISTANT

## 2021-12-15 PROCEDURE — 99233 SBSQ HOSP IP/OBS HIGH 50: CPT | Mod: ,,, | Performed by: PHYSICIAN ASSISTANT

## 2021-12-15 PROCEDURE — 36415 COLL VENOUS BLD VENIPUNCTURE: CPT | Performed by: PHYSICIAN ASSISTANT

## 2021-12-15 PROCEDURE — 11000001 HC ACUTE MED/SURG PRIVATE ROOM

## 2021-12-15 PROCEDURE — 99233 PR SUBSEQUENT HOSPITAL CARE,LEVL III: ICD-10-PCS | Mod: ,,, | Performed by: PHYSICIAN ASSISTANT

## 2021-12-15 PROCEDURE — 86140 C-REACTIVE PROTEIN: CPT | Performed by: PHYSICIAN ASSISTANT

## 2021-12-15 PROCEDURE — 63600175 PHARM REV CODE 636 W HCPCS: Performed by: INTERNAL MEDICINE

## 2021-12-15 PROCEDURE — 85652 RBC SED RATE AUTOMATED: CPT | Performed by: PHYSICIAN ASSISTANT

## 2021-12-15 RX ADMIN — Medication 800 MG: at 09:12

## 2021-12-15 RX ADMIN — ENOXAPARIN SODIUM 30 MG: 30 INJECTION, SOLUTION INTRAVENOUS; SUBCUTANEOUS at 05:12

## 2021-12-15 RX ADMIN — AMLODIPINE BESYLATE 10 MG: 10 TABLET ORAL at 09:12

## 2021-12-16 PROBLEM — M25.431 PAIN AND SWELLING OF WRIST, RIGHT: Status: ACTIVE | Noted: 2021-12-16

## 2021-12-16 PROBLEM — M25.531 PAIN AND SWELLING OF WRIST, RIGHT: Status: ACTIVE | Noted: 2021-12-16

## 2021-12-16 LAB
APPEARANCE FLD: NORMAL
BASOPHILS # BLD AUTO: 0.04 K/UL (ref 0–0.2)
BASOPHILS NFR BLD: 0.7 % (ref 0–1.9)
BODY FLD TYPE: NORMAL
BODY FLD TYPE: NORMAL
COLOR FLD: NORMAL
CRP SERPL-MCNC: 49.1 MG/L (ref 0–8.2)
CRYSTALS FLD MICRO: NEGATIVE
DIFFERENTIAL METHOD: ABNORMAL
EOSINOPHIL # BLD AUTO: 0.2 K/UL (ref 0–0.5)
EOSINOPHIL NFR BLD: 2.8 % (ref 0–8)
ERYTHROCYTE [DISTWIDTH] IN BLOOD BY AUTOMATED COUNT: 13.7 % (ref 11.5–14.5)
ERYTHROCYTE [SEDIMENTATION RATE] IN BLOOD BY WESTERGREN METHOD: 33 MM/HR (ref 0–36)
ESTIMATED AVG GLUCOSE: 220 MG/DL (ref 68–131)
GRAM STN SPEC: NORMAL
GRAM STN SPEC: NORMAL
HBA1C MFR BLD: 9.3 % (ref 4–5.6)
HCT VFR BLD AUTO: 37.9 % (ref 37–48.5)
HGB BLD-MCNC: 13.1 G/DL (ref 12–16)
IMM GRANULOCYTES # BLD AUTO: 0.05 K/UL (ref 0–0.04)
IMM GRANULOCYTES NFR BLD AUTO: 0.8 % (ref 0–0.5)
LYMPHOCYTES # BLD AUTO: 1.9 K/UL (ref 1–4.8)
LYMPHOCYTES NFR BLD: 30.2 % (ref 18–48)
LYMPHOCYTES NFR FLD MANUAL: 10 %
MCH RBC QN AUTO: 28.7 PG (ref 27–31)
MCHC RBC AUTO-ENTMCNC: 34.6 G/DL (ref 32–36)
MCV RBC AUTO: 83 FL (ref 82–98)
MONOCYTES # BLD AUTO: 0.4 K/UL (ref 0.3–1)
MONOCYTES NFR BLD: 7 % (ref 4–15)
MONOS+MACROS NFR FLD MANUAL: 12 %
NEUTROPHILS # BLD AUTO: 3.6 K/UL (ref 1.8–7.7)
NEUTROPHILS NFR BLD: 58.5 % (ref 38–73)
NEUTROPHILS NFR FLD MANUAL: 78 %
NRBC BLD-RTO: 0 /100 WBC
PLATELET # BLD AUTO: 219 K/UL (ref 150–450)
PLATELET BLD QL SMEAR: ABNORMAL
PMV BLD AUTO: 11.9 FL (ref 9.2–12.9)
POCT GLUCOSE: 177 MG/DL (ref 70–110)
POCT GLUCOSE: 178 MG/DL (ref 70–110)
POCT GLUCOSE: 237 MG/DL (ref 70–110)
RBC # BLD AUTO: 4.56 M/UL (ref 4–5.4)
URATE SERPL-MCNC: 2.9 MG/DL (ref 2.4–5.7)
WBC # BLD AUTO: 6.15 K/UL (ref 3.9–12.7)
WBC # FLD: 2280 /CU MM

## 2021-12-16 PROCEDURE — 86140 C-REACTIVE PROTEIN: CPT | Performed by: PHYSICIAN ASSISTANT

## 2021-12-16 PROCEDURE — 36415 COLL VENOUS BLD VENIPUNCTURE: CPT | Performed by: PHYSICIAN ASSISTANT

## 2021-12-16 PROCEDURE — 87102 FUNGUS ISOLATION CULTURE: CPT | Performed by: STUDENT IN AN ORGANIZED HEALTH CARE EDUCATION/TRAINING PROGRAM

## 2021-12-16 PROCEDURE — 87075 CULTR BACTERIA EXCEPT BLOOD: CPT | Performed by: STUDENT IN AN ORGANIZED HEALTH CARE EDUCATION/TRAINING PROGRAM

## 2021-12-16 PROCEDURE — 87116 MYCOBACTERIA CULTURE: CPT | Performed by: STUDENT IN AN ORGANIZED HEALTH CARE EDUCATION/TRAINING PROGRAM

## 2021-12-16 PROCEDURE — 36415 COLL VENOUS BLD VENIPUNCTURE: CPT | Performed by: STUDENT IN AN ORGANIZED HEALTH CARE EDUCATION/TRAINING PROGRAM

## 2021-12-16 PROCEDURE — 83036 HEMOGLOBIN GLYCOSYLATED A1C: CPT | Performed by: STUDENT IN AN ORGANIZED HEALTH CARE EDUCATION/TRAINING PROGRAM

## 2021-12-16 PROCEDURE — 99233 SBSQ HOSP IP/OBS HIGH 50: CPT | Mod: ,,, | Performed by: PHYSICIAN ASSISTANT

## 2021-12-16 PROCEDURE — 87070 CULTURE OTHR SPECIMN AEROBIC: CPT | Performed by: STUDENT IN AN ORGANIZED HEALTH CARE EDUCATION/TRAINING PROGRAM

## 2021-12-16 PROCEDURE — 85025 COMPLETE CBC W/AUTO DIFF WBC: CPT | Performed by: PHYSICIAN ASSISTANT

## 2021-12-16 PROCEDURE — 84550 ASSAY OF BLOOD/URIC ACID: CPT | Performed by: STUDENT IN AN ORGANIZED HEALTH CARE EDUCATION/TRAINING PROGRAM

## 2021-12-16 PROCEDURE — 25000003 PHARM REV CODE 250: Performed by: PHYSICIAN ASSISTANT

## 2021-12-16 PROCEDURE — 63600175 PHARM REV CODE 636 W HCPCS: Performed by: INTERNAL MEDICINE

## 2021-12-16 PROCEDURE — 89051 BODY FLUID CELL COUNT: CPT | Performed by: STUDENT IN AN ORGANIZED HEALTH CARE EDUCATION/TRAINING PROGRAM

## 2021-12-16 PROCEDURE — 94760 N-INVAS EAR/PLS OXIMETRY 1: CPT

## 2021-12-16 PROCEDURE — 99233 PR SUBSEQUENT HOSPITAL CARE,LEVL III: ICD-10-PCS | Mod: ,,, | Performed by: PHYSICIAN ASSISTANT

## 2021-12-16 PROCEDURE — 87206 SMEAR FLUORESCENT/ACID STAI: CPT | Performed by: STUDENT IN AN ORGANIZED HEALTH CARE EDUCATION/TRAINING PROGRAM

## 2021-12-16 PROCEDURE — 85652 RBC SED RATE AUTOMATED: CPT | Performed by: PHYSICIAN ASSISTANT

## 2021-12-16 PROCEDURE — 89060 EXAM SYNOVIAL FLUID CRYSTALS: CPT | Performed by: STUDENT IN AN ORGANIZED HEALTH CARE EDUCATION/TRAINING PROGRAM

## 2021-12-16 PROCEDURE — 11000001 HC ACUTE MED/SURG PRIVATE ROOM

## 2021-12-16 PROCEDURE — 87205 SMEAR GRAM STAIN: CPT | Performed by: STUDENT IN AN ORGANIZED HEALTH CARE EDUCATION/TRAINING PROGRAM

## 2021-12-16 RX ADMIN — ENOXAPARIN SODIUM 30 MG: 30 INJECTION, SOLUTION INTRAVENOUS; SUBCUTANEOUS at 05:12

## 2021-12-16 RX ADMIN — AMLODIPINE BESYLATE 10 MG: 10 TABLET ORAL at 09:12

## 2021-12-17 VITALS
BODY MASS INDEX: 15.91 KG/M2 | HEIGHT: 62 IN | DIASTOLIC BLOOD PRESSURE: 66 MMHG | OXYGEN SATURATION: 93 % | SYSTOLIC BLOOD PRESSURE: 124 MMHG | RESPIRATION RATE: 18 BRPM | TEMPERATURE: 98 F | HEART RATE: 89 BPM | WEIGHT: 86.44 LBS

## 2021-12-17 LAB
PATH INTERP FLD-IMP: NORMAL
POCT GLUCOSE: 148 MG/DL (ref 70–110)
POCT GLUCOSE: 217 MG/DL (ref 70–110)
POCT GLUCOSE: 228 MG/DL (ref 70–110)
POCT GLUCOSE: 242 MG/DL (ref 70–110)
SARS-COV-2 RNA RESP QL NAA+PROBE: NOT DETECTED

## 2021-12-17 PROCEDURE — 63600175 PHARM REV CODE 636 W HCPCS: Performed by: INTERNAL MEDICINE

## 2021-12-17 PROCEDURE — U0005 INFEC AGEN DETEC AMPLI PROBE: HCPCS | Performed by: INTERNAL MEDICINE

## 2021-12-17 PROCEDURE — U0003 INFECTIOUS AGENT DETECTION BY NUCLEIC ACID (DNA OR RNA); SEVERE ACUTE RESPIRATORY SYNDROME CORONAVIRUS 2 (SARS-COV-2) (CORONAVIRUS DISEASE [COVID-19]), AMPLIFIED PROBE TECHNIQUE, MAKING USE OF HIGH THROUGHPUT TECHNOLOGIES AS DESCRIBED BY CMS-2020-01-R: HCPCS | Performed by: INTERNAL MEDICINE

## 2021-12-17 PROCEDURE — 99900035 HC TECH TIME PER 15 MIN (STAT)

## 2021-12-17 PROCEDURE — 94761 N-INVAS EAR/PLS OXIMETRY MLT: CPT

## 2021-12-17 PROCEDURE — 99239 PR HOSPITAL DISCHARGE DAY,>30 MIN: ICD-10-PCS | Mod: ,,, | Performed by: PHYSICIAN ASSISTANT

## 2021-12-17 PROCEDURE — 99239 HOSP IP/OBS DSCHRG MGMT >30: CPT | Mod: ,,, | Performed by: PHYSICIAN ASSISTANT

## 2021-12-17 RX ADMIN — ENOXAPARIN SODIUM 30 MG: 30 INJECTION, SOLUTION INTRAVENOUS; SUBCUTANEOUS at 05:12

## 2021-12-19 ENCOUNTER — EXTERNAL HOSPITAL ADMISSION (OUTPATIENT)
Dept: SKILLED NURSING FACILITY | Facility: HOSPITAL | Age: 66
End: 2021-12-19
Payer: MEDICARE

## 2021-12-19 DIAGNOSIS — I63.511: Primary | ICD-10-CM

## 2021-12-19 LAB — BACTERIA SPEC AEROBE CULT: NO GROWTH

## 2021-12-19 PROCEDURE — 99305 PR NURSING FACILITY CARE, INIT, MOD SEVERITY: ICD-10-PCS | Mod: ,,, | Performed by: INTERNAL MEDICINE

## 2021-12-19 PROCEDURE — 99305 1ST NF CARE MODERATE MDM 35: CPT | Mod: ,,, | Performed by: INTERNAL MEDICINE

## 2021-12-21 ENCOUNTER — EXTERNAL HOSPITAL ADMISSION (OUTPATIENT)
Dept: SKILLED NURSING FACILITY | Facility: HOSPITAL | Age: 66
End: 2021-12-21
Payer: MEDICARE

## 2021-12-21 DIAGNOSIS — I63.511: Primary | ICD-10-CM

## 2021-12-21 PROCEDURE — 99308 SBSQ NF CARE LOW MDM 20: CPT | Mod: ,,, | Performed by: INTERNAL MEDICINE

## 2021-12-21 PROCEDURE — 99308 PR NURSING FAC CARE, SUBSEQ, MINOR COMPLIC: ICD-10-PCS | Mod: ,,, | Performed by: INTERNAL MEDICINE

## 2021-12-22 DIAGNOSIS — E11.8 TYPE 2 DIABETES MELLITUS WITH COMPLICATION, WITHOUT LONG-TERM CURRENT USE OF INSULIN: ICD-10-CM

## 2021-12-22 DIAGNOSIS — Z12.11 COLON CANCER SCREENING: ICD-10-CM

## 2021-12-27 LAB — BACTERIA SPEC ANAEROBE CULT: NORMAL

## 2021-12-29 DIAGNOSIS — E11.8 TYPE 2 DIABETES MELLITUS WITH COMPLICATION, WITHOUT LONG-TERM CURRENT USE OF INSULIN: ICD-10-CM

## 2021-12-30 ENCOUNTER — LAB VISIT (OUTPATIENT)
Dept: LAB | Facility: OTHER | Age: 66
End: 2021-12-30
Payer: MEDICARE

## 2021-12-30 DIAGNOSIS — Z20.822 ENCOUNTER FOR LABORATORY TESTING FOR COVID-19 VIRUS: ICD-10-CM

## 2021-12-30 PROCEDURE — U0003 INFECTIOUS AGENT DETECTION BY NUCLEIC ACID (DNA OR RNA); SEVERE ACUTE RESPIRATORY SYNDROME CORONAVIRUS 2 (SARS-COV-2) (CORONAVIRUS DISEASE [COVID-19]), AMPLIFIED PROBE TECHNIQUE, MAKING USE OF HIGH THROUGHPUT TECHNOLOGIES AS DESCRIBED BY CMS-2020-01-R: HCPCS | Mod: ST72 | Performed by: NURSE PRACTITIONER

## 2022-01-04 ENCOUNTER — EXTERNAL HOSPITAL ADMISSION (OUTPATIENT)
Dept: SKILLED NURSING FACILITY | Facility: HOSPITAL | Age: 67
End: 2022-01-04
Payer: MEDICARE

## 2022-01-04 DIAGNOSIS — I63.511: Primary | ICD-10-CM

## 2022-01-04 LAB
SARS-COV-2 RNA RESP QL NAA+PROBE: NOT DETECTED
SARS-COV-2- CYCLE NUMBER: NORMAL

## 2022-01-04 PROCEDURE — 99308 PR NURSING FAC CARE, SUBSEQ, MINOR COMPLIC: ICD-10-PCS | Mod: ,,, | Performed by: INTERNAL MEDICINE

## 2022-01-04 PROCEDURE — 99308 SBSQ NF CARE LOW MDM 20: CPT | Mod: ,,, | Performed by: INTERNAL MEDICINE

## 2022-01-05 NOTE — PROGRESS NOTES
[facility]   New Visit Progress Note   Recent Hospital Discharge     PRESENTING HISTORY     Chief Complaint/Reason for Admission:  Follow up Hospital Discharge   PCP: Jeff Gaytan MD    History of Present Illness:  Ms. Devika Ashby is a 66 y.o. female who was recently admitted to the hospital.Ms. Devika Ashby is a 66 y.o. female who was recently admitted to the hospital.Ms. Devika Ashby is a 66 y.o. female who was recently admitted to the hospital.66 y.o. who was admitted to hospital medicine for debility, falls. Work-up grossly stable. Patient was evaluated by PTOT who recommended SNF. Patient is medically stable at this time, plan to discharge to a SNF when medically stable.             ___________________________________________________________________    TodayHx of paranoid schizophrenia, admitted fro debility, seems cooperative today but lethargic resting in bed, ox 2: PT befits will end on 1/06        Review of Systems  General ROS: negative for chills, fever or weight loss  Psychological ROS: negative for hallucination, depression or suicidal ideation  Ophthalmic ROS: negative for blurry vision, photophobia or eye pain  ENT ROS: negative for epistaxis, sore throat or rhinorrhea  Respiratory ROS: no cough, shortness of breath, or wheezing  Cardiovascular ROS: no chest pain or dyspnea on exertion  Gastrointestinal ROS: no abdominal pain, change in bowel habits, or black/ bloody stools  Genito-Urinary ROS: no dysuria, trouble voiding, or hematuria  Musculoskeletal ROS: negative for gait disturbance or muscular weakness  Neurological ROS: no syncope or seizures; no ataxia  Dermatological ROS: negative for pruritis, rash and jaundice          PAST HISTORY:     Past Medical History:   Diagnosis Date    CVA (cerebral infarction)     Diabetes mellitus     HLD (hyperlipidemia)     Hypertension     Myasthenia gravis     Severe malnutrition 12/13/2021    Nutrition  Problem: Severe Protein-Calorie Malnutrition Malnutrition in the context of Chronic Illness/Injury  Related to (etiology): Inability to consume sufficient energy Food- and nutrition-related knowledge deficit  Signs and Symptoms (as evidenced by): Energy Intake: <75% of estimated energy requirement for 2 years Body Fat Depletion: moderate and severe depletion of orbitals, triceps and thor       Past Surgical History:   Procedure Laterality Date    bladder lift      BONE MARROW BIOPSY      CHOLECYSTECTOMY      HYSTERECTOMY      PITUITARY SURGERY         Family History   Problem Relation Age of Onset    Arthritis Mother     Cancer Mother     Hearing loss Mother     Heart disease Mother     Hyperlipidemia Mother     Hypertension Mother     Stroke Mother     Alcohol abuse Father     Cancer Father     Diabetes Father     Drug abuse Father     Kidney disease Sister          MEDICATIONS & ALLERGIES:     Current Outpatient Medications on File Prior to Visit   Medication Sig Dispense Refill    amLODIPine (NORVASC) 10 MG tablet Take 1 tablet (10 mg total) by mouth every evening. 90 tablet 3    ascorbic acid (VITAMIN C) 250 MG tablet Take 250 mg by mouth once daily.      aspirin 325 MG tablet Take 325 mg by mouth once daily.      b complex vitamins tablet Take 1 tablet by mouth once daily.      blood sugar diagnostic Strp Use as directed 3-4 times daily to test blood glucose 100 each 3    blood-glucose meter kit Use as directed 3-4 times daily to test blood glucose 1 each 0    cyanocobalamin (VITAMIN B-12) 100 MCG tablet Take 100 mcg by mouth once daily.      diphenhydrAMINE (BENADRYL) 50 MG capsule Take 1 capsule (50 mg total) by mouth every 6 (six) hours as needed for Allergies.  0    fluticasone (FLONASE) 50 mcg/actuation nasal spray 2 sprays by Each Nare route once daily. 16 g 1    ibuprofen (ADVIL,MOTRIN) 200 MG tablet Take 200 mg by mouth every 6 (six) hours as needed for Pain.      lancets  "Misc Use as directed 3-4 times daily to test blood glucose 100 each 3    mupirocin (BACTROBAN) 2 % ointment Apply topically 3 (three) times daily. 22 g 1    omeprazole (PRILOSEC) 10 MG capsule Take 10 mg by mouth once daily.      pen needle, diabetic 32 gauge x 5/32" Ndle Use as directed 4 times daily to inject insulin 100 each 3    pyridoxine, vitamin B6, (VITAMIN B-6) 100 MG Tab Take 50 mg by mouth once daily.      sodium chloride (OCEAN) 0.65 % nasal spray 1 spray by Nasal route as needed (irritation).  12    tretinoin (RETIN-A) 0.05 % cream Apply topically every evening. 20 g 2    vitamin E 100 UNIT capsule Take 100 Units by mouth once daily.       No current facility-administered medications on file prior to visit.        Review of patient's allergies indicates:   Allergen Reactions    Mary Esther     Apple     Avelox [moxifloxacin]     Barley     Coconut     Corn containing products Other (See Comments)     Nausea and hives    Diphtheria-tetanus-pertuss vac     Doxycycline     Fish containing products     Green bean     Hazelnut     Honey     Insulins     Milk containing products     Nutmeg     Pcn [penicillins]     Pistachio nut     Shellfish containing products     Succinylcholine     Wheat containing prod        OBJECTIVE:     Vital Signs:  LMP  (LMP Unknown)   Wt Readings from Last 1 Encounters:   12/12/21 0034 39.2 kg (86 lb 6.7 oz)   [birth year]/1953 47.6 kg (105 lb)     There is no height or weight on file to calculate BMI.        Physical Exam:  LMP  (LMP Unknown)   General appearance: alert, cooperative, no distress  Constitutional:Oriented to person, place, and time  + appears well-developed and well-nourished.   HEENT: Normocephalic, atraumatic, neck symmetrical, no nasal discharge   Eyes: conjunctivae/corneas clear, PERRL, EOM's intact  Lungs: clear to auscultation bilaterally, no dullness to percussion bilaterally  Heart: regular rate and rhythm without rub; no displacement of " "the PMI   Abdomen: soft, non-tender; bowel sounds normoactive; no organomegaly  Extremities: extremities symmetric; no clubbing, cyanosis, or edema  Integument: Skin color, texture, turgor normal; no rashes; hair distrubution normal  Neurologic: Alert and oriented X 3, normal strength, normal coordination and gait  Psychiatric: no pressured speech; normal affect; no evidence of impaired cognition     Laboratory  Lab Results   Component Value Date    WBC 6.15 12/16/2021    HGB 13.1 12/16/2021    HCT 37.9 12/16/2021    MCV 83 12/16/2021     12/16/2021     BMP  Lab Results   Component Value Date     (L) 12/14/2021    K 4.2 12/14/2021     12/14/2021    CO2 22 (L) 12/14/2021    BUN 15 12/14/2021    CREATININE 0.7 12/14/2021    CALCIUM 8.8 12/14/2021    ANIONGAP 11 12/14/2021    ESTGFRAFRICA >60.0 12/14/2021    EGFRNONAA >60.0 12/14/2021     Lab Results   Component Value Date    ALT 12 12/14/2021    AST 10 12/14/2021    ALKPHOS 66 12/14/2021    BILITOT 0.6 12/14/2021     Lab Results   Component Value Date    INR 1.0 12/11/2021    INR 1.0 04/11/2016    INR 1.0 04/11/2016     Lab Results   Component Value Date    HGBA1C 9.3 (H) 12/16/2021       Diagnostic Results:        TRANSITION OF CARE:         ASSESSMENT & PLAN:     HIGH RISK CONDITION(S):  Severe malnutrition  Body mass index (BMI) less than 19  Weakness  Debility      - decreased PO intake leading to progressive weakness, debility limiting her ability to complete ADLs and live independently   - nutritional supplements, encourage PO intake, tolerating PO diet in-house  - Reports her legs have been "giving out" over the last few months causing her to have numerous falls at home  - at baseline, uses a cane but daughter reports noncompliance  - CTH with no acute intracranial abnormalities  - CT neck with no acute process  - UA non-infectious   - orthostatics negative   - Discussed at length with patient about her current state of health, she agrees " "that she is not safe at home alone in her current state. She stated during interview she would be willing to go to a facility temporarily for therapy if that is what is recommended   - PT/OT consulted- SNF --- patient would greatly benefit from post-acute services prior to her return home as it is not safe for her to discharge in her current physicial state       Pain and swelling of wrist, right  - acute onset of right wrist pain, swelling, erythema. Will need to rule out septic arthritis/ gout  - afebrile  - xray without fracture  - CBC/BMP pending  - ESR 18/ CRP 10, repeat pending  - uric acid 2.8  - ortho consulted, appreciate assistance     Hypokalemia  Hypomagnesemia     - Will replace the affected electrolytes and repeat labs to be done after interventions completed.  - Will continue to monitor electrolytes closely.   - K 3.3 on admit, likely 2/2 dehydration  - patient agreeable to potassium bicarbonate  - continue to monitor     Diarrhea  - reports diarrhea over last few days PTA, no further episodes reported   - dehydration could also be contributing to weakness  - IVF  - no concern for c diff at this time given no hx of recent abx or hospitalization, continue to monitor     Noncompliance with medication regimen  Hx of paranoid schizophrenia     Pleasant and cooperative since admission  - extensive hx of noncompliance with medications and daily living  - when asked about noncompliance, patient reports illogical reasoning for each medication that she has been known to refuse, i.e. her ACEi caused her stroke or she had a rash for 6 months after taking insulin  - patient will likely refuse medications and treatment  - patient denies SI/HI, but daughter reports that patient has stated she sometimes wishes that she would go to sleep and not wake up so she can stop suffering. When daughter offers interventions for the "suffering", patient refuses  - Psychiatry consulted, appreciate recs  She was aaox3 and " conversational. She denied psychiatric symptomatology including depression, anxiety, paranoia. No objective evidence of paranoid psychotic process. Stated she does not want to take medications because of previous reported side effects.     Hyperlipidemia  - refuses to take statin as she states it gives her body aches. I offered to change her medication as that is a common side effect, and she refused  - lipid panel pending     Hypertension, essential  - Daughter states that she was taking Norvasc daily, but since her Rx ran out she has refused to get a refill  - patient agreeable to home norvasc 10mg daily     Type 2 diabetes mellitus with complication, without long-term current use of insulin  - not on home medications and patient refused home metformin  - glucose 180 on admit  - a1c pending  - started on LDSSI, though patient will likely refuse it  - Acccarlos ACHS  - hypoglycemic protocol  - diabetic diet  Instructions for the patient:      Scheduled Follow-up :  Future Appointments   Date Time Provider Department Center   1/6/2022  3:20 PM [facility] SPEC LAB [facility] SPECLAB [facility]       Post Visit Medication List:     Medication List          Accurate as of January 4, 2022  6:22 PM. If you have any questions, ask your nurse or doctor.            CONTINUE taking these medications    amLODIPine 10 MG tablet  Commonly known as: NORVASC  Take 1 tablet (10 mg total) by mouth every evening.     ascorbic acid (vitamin C) 250 MG tablet  Commonly known as: VITAMIN C     aspirin 325 MG tablet     b complex vitamins tablet     blood sugar diagnostic Strp  Use as directed 3-4 times daily to test blood glucose     blood-glucose meter kit  Use as directed 3-4 times daily to test blood glucose     cyanocobalamin 100 MCG tablet  Commonly known as: VITAMIN B-12     diphenhydrAMINE 50 MG capsule  Commonly known as: BENADRYL  Take 1 capsule (50 mg total) by mouth every 6 (six) hours as needed for  "Allergies.     fluticasone propionate 50 mcg/actuation nasal spray  Commonly known as: FLONASE  2 sprays by Each Nare route once daily.     ibuprofen 200 MG tablet  Commonly known as: ADVIL,MOTRIN     lancets Misc  Use as directed 3-4 times daily to test blood glucose     mupirocin 2 % ointment  Commonly known as: BACTROBAN  Apply topically 3 (three) times daily.     omeprazole 10 MG capsule  Commonly known as: PRILOSEC     pen needle, diabetic 32 gauge x 5/32" Ndle  Use as directed 4 times daily to inject insulin     sodium chloride 0.65 % nasal spray  Commonly known as: OCEAN  1 spray by Nasal route as needed (irritation).     tretinoin 0.05 % cream  Commonly known as: RETIN-A  Apply topically every evening.     VITAMIN B-6 100 MG Tab  Generic drug: pyridoxine (vitamin B6)     vitamin E 100 UNIT capsule            Signing Physician:  Reymundo Johnson MD   "

## 2022-01-17 LAB — FUNGUS SPEC CULT: NORMAL

## 2022-01-19 DIAGNOSIS — E11.8 TYPE 2 DIABETES MELLITUS WITH COMPLICATION, WITHOUT LONG-TERM CURRENT USE OF INSULIN: ICD-10-CM

## 2022-01-21 ENCOUNTER — HOSPITAL ENCOUNTER (INPATIENT)
Facility: HOSPITAL | Age: 67
LOS: 2 days | Discharge: NURSING, REHAB OR LONG-TERM CARE | DRG: 689 | End: 2022-01-25
Attending: EMERGENCY MEDICINE | Admitting: INTERNAL MEDICINE
Payer: MEDICARE

## 2022-01-21 DIAGNOSIS — J18.9 PNEUMONIA OF RIGHT LOWER LOBE DUE TO INFECTIOUS ORGANISM: ICD-10-CM

## 2022-01-21 DIAGNOSIS — J18.9 PNEUMONIA: ICD-10-CM

## 2022-01-21 DIAGNOSIS — R53.1 WEAKNESS: ICD-10-CM

## 2022-01-21 DIAGNOSIS — N39.0 URINARY TRACT INFECTION WITHOUT HEMATURIA, SITE UNSPECIFIED: ICD-10-CM

## 2022-01-21 DIAGNOSIS — E87.6 HYPOKALEMIA: Primary | ICD-10-CM

## 2022-01-21 DIAGNOSIS — N39.0 UTI (URINARY TRACT INFECTION): ICD-10-CM

## 2022-01-21 PROBLEM — U07.1 COVID-19 VIRUS INFECTION: Status: ACTIVE | Noted: 2022-01-21

## 2022-01-21 LAB
ALBUMIN SERPL BCP-MCNC: 2.7 G/DL (ref 3.5–5.2)
ALP SERPL-CCNC: 61 U/L (ref 55–135)
ALT SERPL W/O P-5'-P-CCNC: 22 U/L (ref 10–44)
ANION GAP SERPL CALC-SCNC: 13 MMOL/L (ref 8–16)
ANISOCYTOSIS BLD QL SMEAR: SLIGHT
APTT BLDCRRT: 26.1 SEC (ref 21–32)
AST SERPL-CCNC: 22 U/L (ref 10–40)
BACTERIA #/AREA URNS AUTO: ABNORMAL /HPF
BASOPHILS # BLD AUTO: 0.01 K/UL (ref 0–0.2)
BASOPHILS NFR BLD: 0.3 % (ref 0–1.9)
BILIRUB SERPL-MCNC: 0.6 MG/DL (ref 0.1–1)
BILIRUB UR QL STRIP: NEGATIVE
BNP SERPL-MCNC: 152 PG/ML (ref 0–99)
BUN SERPL-MCNC: 12 MG/DL (ref 8–23)
CALCIUM SERPL-MCNC: 8.1 MG/DL (ref 8.7–10.5)
CAOX CRY UR QL COMP ASSIST: ABNORMAL
CHLORIDE SERPL-SCNC: 97 MMOL/L (ref 95–110)
CK SERPL-CCNC: 24 U/L (ref 20–180)
CLARITY UR REFRACT.AUTO: ABNORMAL
CO2 SERPL-SCNC: 25 MMOL/L (ref 23–29)
COLOR UR AUTO: ABNORMAL
CREAT SERPL-MCNC: 0.6 MG/DL (ref 0.5–1.4)
CTP QC/QA: YES
DIFFERENTIAL METHOD: ABNORMAL
EOSINOPHIL # BLD AUTO: 0 K/UL (ref 0–0.5)
EOSINOPHIL NFR BLD: 0.3 % (ref 0–8)
ERYTHROCYTE [DISTWIDTH] IN BLOOD BY AUTOMATED COUNT: 13.3 % (ref 11.5–14.5)
ERYTHROCYTE [SEDIMENTATION RATE] IN BLOOD BY WESTERGREN METHOD: 36 MM/HR (ref 0–36)
EST. GFR  (AFRICAN AMERICAN): >60 ML/MIN/1.73 M^2
EST. GFR  (NON AFRICAN AMERICAN): >60 ML/MIN/1.73 M^2
FERRITIN SERPL-MCNC: 799 NG/ML (ref 20–300)
GLUCOSE SERPL-MCNC: 134 MG/DL (ref 70–110)
GLUCOSE SERPL-MCNC: 166 MG/DL (ref 70–110)
GLUCOSE UR QL STRIP: ABNORMAL
HCT VFR BLD AUTO: 38 % (ref 37–48.5)
HGB BLD-MCNC: 13 G/DL (ref 12–16)
HGB UR QL STRIP: NEGATIVE
HYALINE CASTS UR QL AUTO: 0 /LPF
IMM GRANULOCYTES # BLD AUTO: 0.01 K/UL (ref 0–0.04)
IMM GRANULOCYTES NFR BLD AUTO: 0.3 % (ref 0–0.5)
INR PPP: 0.9 (ref 0.8–1.2)
KETONES UR QL STRIP: ABNORMAL
LACTATE SERPL-SCNC: 1 MMOL/L (ref 0.5–2.2)
LDH SERPL L TO P-CCNC: 236 U/L (ref 110–260)
LEUKOCYTE ESTERASE UR QL STRIP: NEGATIVE
LIPASE SERPL-CCNC: 9 U/L (ref 4–60)
LYMPHOCYTES # BLD AUTO: 1.2 K/UL (ref 1–4.8)
LYMPHOCYTES NFR BLD: 35.8 % (ref 18–48)
MAGNESIUM SERPL-MCNC: 1.7 MG/DL (ref 1.6–2.6)
MCH RBC QN AUTO: 28.8 PG (ref 27–31)
MCHC RBC AUTO-ENTMCNC: 34.2 G/DL (ref 32–36)
MCV RBC AUTO: 84 FL (ref 82–98)
MICROSCOPIC COMMENT: ABNORMAL
MONOCYTES # BLD AUTO: 0.4 K/UL (ref 0.3–1)
MONOCYTES NFR BLD: 10.9 % (ref 4–15)
NEUTROPHILS # BLD AUTO: 1.8 K/UL (ref 1.8–7.7)
NEUTROPHILS NFR BLD: 52.4 % (ref 38–73)
NITRITE UR QL STRIP: NEGATIVE
NRBC BLD-RTO: 0 /100 WBC
PH UR STRIP: 6 [PH] (ref 5–8)
PLATELET # BLD AUTO: 264 K/UL (ref 150–450)
PLATELET BLD QL SMEAR: ABNORMAL
PMV BLD AUTO: 10.4 FL (ref 9.2–12.9)
POCT GLUCOSE: 134 MG/DL (ref 70–110)
POIKILOCYTOSIS BLD QL SMEAR: SLIGHT
POTASSIUM SERPL-SCNC: 2.8 MMOL/L (ref 3.5–5.1)
PROCALCITONIN SERPL IA-MCNC: 0.02 NG/ML
PROT SERPL-MCNC: 5.6 G/DL (ref 6–8.4)
PROT UR QL STRIP: ABNORMAL
PROTHROMBIN TIME: 10.4 SEC (ref 9–12.5)
RBC # BLD AUTO: 4.51 M/UL (ref 4–5.4)
RBC #/AREA URNS AUTO: 15 /HPF (ref 0–4)
SARS-COV-2 RDRP RESP QL NAA+PROBE: POSITIVE
SODIUM SERPL-SCNC: 135 MMOL/L (ref 136–145)
SP GR UR STRIP: 1.02 (ref 1–1.03)
SQUAMOUS #/AREA URNS AUTO: 2 /HPF
TROPONIN I SERPL DL<=0.01 NG/ML-MCNC: 0.02 NG/ML (ref 0–0.03)
URN SPEC COLLECT METH UR: ABNORMAL
WBC # BLD AUTO: 3.38 K/UL (ref 3.9–12.7)
WBC #/AREA URNS AUTO: 25 /HPF (ref 0–5)
WBC CLUMPS UR QL AUTO: ABNORMAL
YEAST UR QL AUTO: ABNORMAL

## 2022-01-21 PROCEDURE — 25000003 PHARM REV CODE 250: Performed by: EMERGENCY MEDICINE

## 2022-01-21 PROCEDURE — 99285 PR EMERGENCY DEPT VISIT,LEVEL V: ICD-10-PCS | Mod: CR,CS,, | Performed by: EMERGENCY MEDICINE

## 2022-01-21 PROCEDURE — 63600175 PHARM REV CODE 636 W HCPCS: Performed by: EMERGENCY MEDICINE

## 2022-01-21 PROCEDURE — 99285 EMERGENCY DEPT VISIT HI MDM: CPT | Mod: CR,CS,, | Performed by: EMERGENCY MEDICINE

## 2022-01-21 PROCEDURE — 83880 ASSAY OF NATRIURETIC PEPTIDE: CPT | Performed by: NURSE PRACTITIONER

## 2022-01-21 PROCEDURE — 83690 ASSAY OF LIPASE: CPT | Performed by: EMERGENCY MEDICINE

## 2022-01-21 PROCEDURE — 80053 COMPREHEN METABOLIC PANEL: CPT | Performed by: EMERGENCY MEDICINE

## 2022-01-21 PROCEDURE — 99220 PR INITIAL OBSERVATION CARE,LEVL III: ICD-10-PCS | Mod: CS,,, | Performed by: NURSE PRACTITIONER

## 2022-01-21 PROCEDURE — 96365 THER/PROPH/DIAG IV INF INIT: CPT

## 2022-01-21 PROCEDURE — 25000003 PHARM REV CODE 250: Performed by: NURSE PRACTITIONER

## 2022-01-21 PROCEDURE — 96375 TX/PRO/DX INJ NEW DRUG ADDON: CPT

## 2022-01-21 PROCEDURE — 96367 TX/PROPH/DG ADDL SEQ IV INF: CPT

## 2022-01-21 PROCEDURE — 93010 EKG 12-LEAD: ICD-10-PCS | Mod: ,,, | Performed by: INTERNAL MEDICINE

## 2022-01-21 PROCEDURE — 93005 ELECTROCARDIOGRAM TRACING: CPT

## 2022-01-21 PROCEDURE — G0378 HOSPITAL OBSERVATION PER HR: HCPCS

## 2022-01-21 PROCEDURE — 85652 RBC SED RATE AUTOMATED: CPT | Performed by: NURSE PRACTITIONER

## 2022-01-21 PROCEDURE — 85730 THROMBOPLASTIN TIME PARTIAL: CPT | Performed by: NURSE PRACTITIONER

## 2022-01-21 PROCEDURE — 85610 PROTHROMBIN TIME: CPT | Performed by: NURSE PRACTITIONER

## 2022-01-21 PROCEDURE — 93010 ELECTROCARDIOGRAM REPORT: CPT | Mod: ,,, | Performed by: INTERNAL MEDICINE

## 2022-01-21 PROCEDURE — 82728 ASSAY OF FERRITIN: CPT | Performed by: NURSE PRACTITIONER

## 2022-01-21 PROCEDURE — 99220 PR INITIAL OBSERVATION CARE,LEVL III: CPT | Mod: CS,,, | Performed by: NURSE PRACTITIONER

## 2022-01-21 PROCEDURE — 84145 PROCALCITONIN (PCT): CPT | Performed by: NURSE PRACTITIONER

## 2022-01-21 PROCEDURE — 84484 ASSAY OF TROPONIN QUANT: CPT | Performed by: NURSE PRACTITIONER

## 2022-01-21 PROCEDURE — 87040 BLOOD CULTURE FOR BACTERIA: CPT | Performed by: EMERGENCY MEDICINE

## 2022-01-21 PROCEDURE — 99285 EMERGENCY DEPT VISIT HI MDM: CPT | Mod: 25

## 2022-01-21 PROCEDURE — 83615 LACTATE (LD) (LDH) ENZYME: CPT | Performed by: NURSE PRACTITIONER

## 2022-01-21 PROCEDURE — 81001 URINALYSIS AUTO W/SCOPE: CPT | Performed by: EMERGENCY MEDICINE

## 2022-01-21 PROCEDURE — 82550 ASSAY OF CK (CPK): CPT | Performed by: NURSE PRACTITIONER

## 2022-01-21 PROCEDURE — 83735 ASSAY OF MAGNESIUM: CPT | Performed by: EMERGENCY MEDICINE

## 2022-01-21 PROCEDURE — 87086 URINE CULTURE/COLONY COUNT: CPT | Performed by: EMERGENCY MEDICINE

## 2022-01-21 PROCEDURE — 83605 ASSAY OF LACTIC ACID: CPT | Performed by: EMERGENCY MEDICINE

## 2022-01-21 PROCEDURE — U0002 COVID-19 LAB TEST NON-CDC: HCPCS | Performed by: NURSE PRACTITIONER

## 2022-01-21 PROCEDURE — 85025 COMPLETE CBC W/AUTO DIFF WBC: CPT | Performed by: EMERGENCY MEDICINE

## 2022-01-21 RX ORDER — LANOLIN ALCOHOL/MO/W.PET/CERES
400 CREAM (GRAM) TOPICAL ONCE
Status: COMPLETED | OUTPATIENT
Start: 2022-01-21 | End: 2022-01-21

## 2022-01-21 RX ORDER — ASPIRIN 325 MG
325 TABLET ORAL DAILY
Status: DISCONTINUED | OUTPATIENT
Start: 2022-01-22 | End: 2022-01-25 | Stop reason: HOSPADM

## 2022-01-21 RX ORDER — DIPHENHYDRAMINE HCL 50 MG
50 CAPSULE ORAL EVERY 6 HOURS PRN
Status: DISCONTINUED | OUTPATIENT
Start: 2022-01-21 | End: 2022-01-25 | Stop reason: HOSPADM

## 2022-01-21 RX ORDER — ASCORBIC ACID 250 MG
250 TABLET ORAL DAILY
Status: DISCONTINUED | OUTPATIENT
Start: 2022-01-22 | End: 2022-01-25 | Stop reason: HOSPADM

## 2022-01-21 RX ORDER — POTASSIUM CHLORIDE 7.45 MG/ML
10 INJECTION INTRAVENOUS ONCE
Status: COMPLETED | OUTPATIENT
Start: 2022-01-21 | End: 2022-01-21

## 2022-01-21 RX ORDER — METOCLOPRAMIDE HYDROCHLORIDE 5 MG/ML
10 INJECTION INTRAMUSCULAR; INTRAVENOUS EVERY 6 HOURS PRN
Status: DISCONTINUED | OUTPATIENT
Start: 2022-01-21 | End: 2022-01-25 | Stop reason: HOSPADM

## 2022-01-21 RX ORDER — PANTOPRAZOLE SODIUM 40 MG/1
40 TABLET, DELAYED RELEASE ORAL DAILY
Status: DISCONTINUED | OUTPATIENT
Start: 2022-01-22 | End: 2022-01-25 | Stop reason: HOSPADM

## 2022-01-21 RX ORDER — ALBUTEROL SULFATE 90 UG/1
2 AEROSOL, METERED RESPIRATORY (INHALATION) EVERY 6 HOURS PRN
Status: DISCONTINUED | OUTPATIENT
Start: 2022-01-21 | End: 2022-01-23

## 2022-01-21 RX ORDER — ONDANSETRON 2 MG/ML
4 INJECTION INTRAMUSCULAR; INTRAVENOUS
Status: COMPLETED | OUTPATIENT
Start: 2022-01-21 | End: 2022-01-21

## 2022-01-21 RX ORDER — INSULIN ASPART 100 [IU]/ML
0-5 INJECTION, SOLUTION INTRAVENOUS; SUBCUTANEOUS
Status: DISCONTINUED | OUTPATIENT
Start: 2022-01-21 | End: 2022-01-25 | Stop reason: HOSPADM

## 2022-01-21 RX ORDER — POTASSIUM CHLORIDE 20 MEQ/1
20 TABLET, EXTENDED RELEASE ORAL 2 TIMES DAILY
Qty: 10 TABLET | Refills: 0 | Status: SHIPPED | OUTPATIENT
Start: 2022-01-21 | End: 2022-01-25 | Stop reason: SDUPTHER

## 2022-01-21 RX ORDER — LANOLIN ALCOHOL/MO/W.PET/CERES
50 CREAM (GRAM) TOPICAL DAILY
Status: DISCONTINUED | OUTPATIENT
Start: 2022-01-22 | End: 2022-01-25 | Stop reason: HOSPADM

## 2022-01-21 RX ORDER — ALBUTEROL SULFATE 90 UG/1
2 AEROSOL, METERED RESPIRATORY (INHALATION) EVERY 6 HOURS
Status: DISCONTINUED | OUTPATIENT
Start: 2022-01-21 | End: 2022-01-23

## 2022-01-21 RX ORDER — FLUTICASONE PROPIONATE 50 MCG
2 SPRAY, SUSPENSION (ML) NASAL DAILY
Status: DISCONTINUED | OUTPATIENT
Start: 2022-01-22 | End: 2022-01-25 | Stop reason: HOSPADM

## 2022-01-21 RX ORDER — ACETAMINOPHEN 325 MG/1
650 TABLET ORAL EVERY 6 HOURS PRN
Status: DISCONTINUED | OUTPATIENT
Start: 2022-01-21 | End: 2022-01-25 | Stop reason: HOSPADM

## 2022-01-21 RX ORDER — IBUPROFEN 200 MG
24 TABLET ORAL
Status: DISCONTINUED | OUTPATIENT
Start: 2022-01-21 | End: 2022-01-25 | Stop reason: HOSPADM

## 2022-01-21 RX ORDER — GLUCAGON 1 MG
1 KIT INJECTION
Status: DISCONTINUED | OUTPATIENT
Start: 2022-01-21 | End: 2022-01-25 | Stop reason: HOSPADM

## 2022-01-21 RX ORDER — ENOXAPARIN SODIUM 100 MG/ML
30 INJECTION SUBCUTANEOUS
Status: DISCONTINUED | OUTPATIENT
Start: 2022-01-22 | End: 2022-01-25 | Stop reason: HOSPADM

## 2022-01-21 RX ORDER — CYANOCOBALAMIN (VITAMIN B-12) 250 MCG
250 TABLET ORAL DAILY
Status: DISCONTINUED | OUTPATIENT
Start: 2022-01-22 | End: 2022-01-25 | Stop reason: HOSPADM

## 2022-01-21 RX ORDER — ONDANSETRON 2 MG/ML
4 INJECTION INTRAMUSCULAR; INTRAVENOUS EVERY 8 HOURS PRN
Status: DISCONTINUED | OUTPATIENT
Start: 2022-01-21 | End: 2022-01-21 | Stop reason: ALTCHOICE

## 2022-01-21 RX ORDER — AMLODIPINE BESYLATE 10 MG/1
10 TABLET ORAL NIGHTLY
Status: DISCONTINUED | OUTPATIENT
Start: 2022-01-22 | End: 2022-01-25 | Stop reason: HOSPADM

## 2022-01-21 RX ORDER — TALC
6 POWDER (GRAM) TOPICAL NIGHTLY PRN
Status: DISCONTINUED | OUTPATIENT
Start: 2022-01-21 | End: 2022-01-25 | Stop reason: HOSPADM

## 2022-01-21 RX ORDER — ONDANSETRON 4 MG/1
4 TABLET, FILM COATED ORAL EVERY 8 HOURS PRN
Qty: 12 TABLET | Refills: 0 | Status: SHIPPED | OUTPATIENT
Start: 2022-01-21 | End: 2022-01-25 | Stop reason: HOSPADM

## 2022-01-21 RX ORDER — VITAMIN E 268 MG
400 CAPSULE ORAL DAILY
Status: DISCONTINUED | OUTPATIENT
Start: 2022-01-22 | End: 2022-01-25 | Stop reason: HOSPADM

## 2022-01-21 RX ORDER — SODIUM CHLORIDE 0.9 % (FLUSH) 0.9 %
10 SYRINGE (ML) INJECTION
Status: DISCONTINUED | OUTPATIENT
Start: 2022-01-21 | End: 2022-01-25 | Stop reason: HOSPADM

## 2022-01-21 RX ORDER — IBUPROFEN 200 MG
16 TABLET ORAL
Status: DISCONTINUED | OUTPATIENT
Start: 2022-01-21 | End: 2022-01-25 | Stop reason: HOSPADM

## 2022-01-21 RX ORDER — BENZONATATE 100 MG/1
100 CAPSULE ORAL 3 TIMES DAILY PRN
Status: DISCONTINUED | OUTPATIENT
Start: 2022-01-21 | End: 2022-01-25 | Stop reason: HOSPADM

## 2022-01-21 RX ADMIN — Medication 400 MG: at 08:01

## 2022-01-21 RX ADMIN — CEFTRIAXONE 2 G: 2 INJECTION, SOLUTION INTRAVENOUS at 06:01

## 2022-01-21 RX ADMIN — POTASSIUM CHLORIDE 10 MEQ: 7.46 INJECTION, SOLUTION INTRAVENOUS at 03:01

## 2022-01-21 RX ADMIN — ONDANSETRON 4 MG: 2 INJECTION INTRAMUSCULAR; INTRAVENOUS at 03:01

## 2022-01-21 RX ADMIN — SODIUM CHLORIDE, SODIUM LACTATE, POTASSIUM CHLORIDE, AND CALCIUM CHLORIDE 1000 ML: .6; .31; .03; .02 INJECTION, SOLUTION INTRAVENOUS at 03:01

## 2022-01-21 RX ADMIN — POTASSIUM BICARBONATE 50 MEQ: 978 TABLET, EFFERVESCENT ORAL at 08:01

## 2022-01-21 RX ADMIN — POTASSIUM BICARBONATE 40 MEQ: 391 TABLET, EFFERVESCENT ORAL at 03:01

## 2022-01-21 NOTE — DISCHARGE INSTRUCTIONS
You were seen in the emergency department for weakness associated with your COVID infection.  Take potassium chloride as prescribed until your potassium is within normal limits.  Take Zofran as prescribed if needed for nausea.  It is very important for you to eat and drink regularly, and you should take nutrition supplements to assist.

## 2022-01-21 NOTE — ED NOTES
Pt to ED from nursing home for eval of dec appetite. Pt daughter reports dec appetite and oral intake for weeks, worsening. Pt started with productive cough last week, tested pos for COVID on Sunday. Pt reports weakness, intermittent nausea. Daughter at bedside. Jimmy applied to pt.

## 2022-01-22 LAB
ALBUMIN SERPL BCP-MCNC: 2.8 G/DL (ref 3.5–5.2)
ALP SERPL-CCNC: 65 U/L (ref 55–135)
ALT SERPL W/O P-5'-P-CCNC: 21 U/L (ref 10–44)
ANION GAP SERPL CALC-SCNC: 12 MMOL/L (ref 8–16)
ANISOCYTOSIS BLD QL SMEAR: SLIGHT
AST SERPL-CCNC: 23 U/L (ref 10–40)
BASOPHILS # BLD AUTO: 0.03 K/UL (ref 0–0.2)
BASOPHILS NFR BLD: 0.7 % (ref 0–1.9)
BILIRUB SERPL-MCNC: 0.5 MG/DL (ref 0.1–1)
BUN SERPL-MCNC: 9 MG/DL (ref 8–23)
CALCIUM SERPL-MCNC: 8.6 MG/DL (ref 8.7–10.5)
CHLORIDE SERPL-SCNC: 98 MMOL/L (ref 95–110)
CO2 SERPL-SCNC: 25 MMOL/L (ref 23–29)
CREAT SERPL-MCNC: 0.5 MG/DL (ref 0.5–1.4)
D DIMER PPP IA.FEU-MCNC: 0.73 MG/L FEU
DACRYOCYTES BLD QL SMEAR: ABNORMAL
DIFFERENTIAL METHOD: ABNORMAL
EOSINOPHIL # BLD AUTO: 0 K/UL (ref 0–0.5)
EOSINOPHIL NFR BLD: 0.5 % (ref 0–8)
ERYTHROCYTE [DISTWIDTH] IN BLOOD BY AUTOMATED COUNT: 13.5 % (ref 11.5–14.5)
EST. GFR  (AFRICAN AMERICAN): >60 ML/MIN/1.73 M^2
EST. GFR  (NON AFRICAN AMERICAN): >60 ML/MIN/1.73 M^2
GLUCOSE SERPL-MCNC: 187 MG/DL (ref 70–110)
HCT VFR BLD AUTO: 42.2 % (ref 37–48.5)
HGB BLD-MCNC: 13.5 G/DL (ref 12–16)
IMM GRANULOCYTES # BLD AUTO: 0.01 K/UL (ref 0–0.04)
IMM GRANULOCYTES NFR BLD AUTO: 0.2 % (ref 0–0.5)
LYMPHOCYTES # BLD AUTO: 0.9 K/UL (ref 1–4.8)
LYMPHOCYTES NFR BLD: 20 % (ref 18–48)
MAGNESIUM SERPL-MCNC: 1.7 MG/DL (ref 1.6–2.6)
MCH RBC QN AUTO: 28.2 PG (ref 27–31)
MCHC RBC AUTO-ENTMCNC: 32 G/DL (ref 32–36)
MCV RBC AUTO: 88 FL (ref 82–98)
MONOCYTES # BLD AUTO: 0.4 K/UL (ref 0.3–1)
MONOCYTES NFR BLD: 9.6 % (ref 4–15)
NEUTROPHILS # BLD AUTO: 3 K/UL (ref 1.8–7.7)
NEUTROPHILS NFR BLD: 69 % (ref 38–73)
NRBC BLD-RTO: 0 /100 WBC
OVALOCYTES BLD QL SMEAR: ABNORMAL
PLATELET # BLD AUTO: 277 K/UL (ref 150–450)
PLATELET BLD QL SMEAR: ABNORMAL
PMV BLD AUTO: 10.6 FL (ref 9.2–12.9)
POCT GLUCOSE: 166 MG/DL (ref 70–110)
POCT GLUCOSE: 176 MG/DL (ref 70–110)
POCT GLUCOSE: 232 MG/DL (ref 70–110)
POCT GLUCOSE: 249 MG/DL (ref 70–110)
POIKILOCYTOSIS BLD QL SMEAR: SLIGHT
POTASSIUM SERPL-SCNC: 3.6 MMOL/L (ref 3.5–5.1)
PROT SERPL-MCNC: 5.8 G/DL (ref 6–8.4)
RBC # BLD AUTO: 4.79 M/UL (ref 4–5.4)
SODIUM SERPL-SCNC: 135 MMOL/L (ref 136–145)
WBC # BLD AUTO: 4.29 K/UL (ref 3.9–12.7)
WBC TOXIC VACUOLES BLD QL SMEAR: PRESENT

## 2022-01-22 PROCEDURE — 85025 COMPLETE CBC W/AUTO DIFF WBC: CPT | Performed by: NURSE PRACTITIONER

## 2022-01-22 PROCEDURE — 80053 COMPREHEN METABOLIC PANEL: CPT | Performed by: NURSE PRACTITIONER

## 2022-01-22 PROCEDURE — 25000003 PHARM REV CODE 250: Performed by: NURSE PRACTITIONER

## 2022-01-22 PROCEDURE — 87070 CULTURE OTHR SPECIMN AEROBIC: CPT | Performed by: NURSE PRACTITIONER

## 2022-01-22 PROCEDURE — 85379 FIBRIN DEGRADATION QUANT: CPT | Performed by: INTERNAL MEDICINE

## 2022-01-22 PROCEDURE — 99900035 HC TECH TIME PER 15 MIN (STAT)

## 2022-01-22 PROCEDURE — 94640 AIRWAY INHALATION TREATMENT: CPT

## 2022-01-22 PROCEDURE — 87205 SMEAR GRAM STAIN: CPT | Performed by: NURSE PRACTITIONER

## 2022-01-22 PROCEDURE — G0378 HOSPITAL OBSERVATION PER HR: HCPCS

## 2022-01-22 PROCEDURE — 94761 N-INVAS EAR/PLS OXIMETRY MLT: CPT

## 2022-01-22 PROCEDURE — 36415 COLL VENOUS BLD VENIPUNCTURE: CPT | Performed by: INTERNAL MEDICINE

## 2022-01-22 PROCEDURE — 25000242 PHARM REV CODE 250 ALT 637 W/ HCPCS: Performed by: NURSE PRACTITIONER

## 2022-01-22 PROCEDURE — 83735 ASSAY OF MAGNESIUM: CPT | Performed by: NURSE PRACTITIONER

## 2022-01-22 PROCEDURE — 36415 COLL VENOUS BLD VENIPUNCTURE: CPT | Performed by: NURSE PRACTITIONER

## 2022-01-22 PROCEDURE — 63600175 PHARM REV CODE 636 W HCPCS: Performed by: NURSE PRACTITIONER

## 2022-01-22 RX ADMIN — ENOXAPARIN SODIUM 30 MG: 30 INJECTION, SOLUTION INTRAVENOUS; SUBCUTANEOUS at 04:01

## 2022-01-22 RX ADMIN — AMLODIPINE BESYLATE 10 MG: 10 TABLET ORAL at 08:01

## 2022-01-22 RX ADMIN — INSULIN ASPART 2 UNITS: 100 INJECTION, SOLUTION INTRAVENOUS; SUBCUTANEOUS at 10:01

## 2022-01-22 RX ADMIN — CYANOCOBALAMIN TAB 250 MCG 250 MCG: 250 TAB at 09:01

## 2022-01-22 RX ADMIN — CEFTRIAXONE 1 G: 1 INJECTION, POWDER, FOR SOLUTION INTRAMUSCULAR; INTRAVENOUS at 12:01

## 2022-01-22 RX ADMIN — METOCLOPRAMIDE 10 MG: 5 INJECTION, SOLUTION INTRAMUSCULAR; INTRAVENOUS at 09:01

## 2022-01-22 RX ADMIN — ALBUTEROL SULFATE 2 PUFF: 108 INHALANT RESPIRATORY (INHALATION) at 12:01

## 2022-01-22 RX ADMIN — PYRIDOXINE HCL TAB 50 MG 50 MG: 50 TAB at 09:01

## 2022-01-22 RX ADMIN — Medication 250 MG: at 09:01

## 2022-01-22 RX ADMIN — Medication 400 UNITS: at 09:01

## 2022-01-22 RX ADMIN — ASPIRIN 325 MG ORAL TABLET 325 MG: 325 PILL ORAL at 09:01

## 2022-01-22 RX ADMIN — PANTOPRAZOLE SODIUM 40 MG: 40 TABLET, DELAYED RELEASE ORAL at 09:01

## 2022-01-22 RX ADMIN — ALBUTEROL SULFATE 2 PUFF: 108 INHALANT RESPIRATORY (INHALATION) at 07:01

## 2022-01-22 RX ADMIN — ALBUTEROL SULFATE 2 PUFF: 108 INHALANT RESPIRATORY (INHALATION) at 08:01

## 2022-01-22 NOTE — ASSESSMENT & PLAN NOTE
Patient is not hypoxic or tachypneic. She complains of a cough, decreased appetite, nausea, and fatigue. Patient was initially COVID + on 1/16 and was positive again today. Does not meet criteria for decadron or remdesivir.     - COVID-19 testing   - Infection Control notified     - Isolation:   - Airborne, Contact and Droplet Precautions  - Cohort patients into COVID units  - N95 mask, wear eye protection  - 20 second hand hygiene              - Limit visitors per hospital policy              - Consolidating lab draws, nursing care, provider visits, and interventions    - Diagnostics: (leukopenia, hyponatremia, hyperferritinemia, elevated troponin, elevated d-dimer, age, and comorbidities are significant predictors of poor clinical outcome)  CBC, CMP, Procalcitonin, Ferritin, CRP, LDH, BNP, Troponin, Portable CXR and UA and culture    - Management:  Supplemental O2 to maintain SpO2 >92%  Telemetry  Continuous/intermittent Pulse Ox  Albuterol treatment PRN

## 2022-01-22 NOTE — ASSESSMENT & PLAN NOTE
Patient c/o productive cough x1 week. Tested positive for COVID on 1/16 and again today. Patient is not hypoxic or tachypneic.  -1/4 SIRS  -CXR concerning for possible new infiltrate right lower lung zone.  -Started on rocephin in the ED, continue for now. Has PCN allergy, doxycycline allergy, and history of myasthenia gravis so was not started on azithromycin.  -Sputum cx  -Albuterol inhaler PRN

## 2022-01-22 NOTE — ED NOTES
"Spoke with pt's daughter Grace. Provided her with updates on pt's admission. Pt daughter requested to be updated with updates "no matter how late". Works as nightshift RN  "

## 2022-01-22 NOTE — PROGRESS NOTES
Hospital Medicine  Progress Note  [facility]      Patient Name: Devika Ashby  MRN:  [id number]  Hospital Medicine Team: [facility] Perico Mc MD  Date of Admission:  1/21/2022     Length of Stay:  LOS: 0 days       Principal Problem:  UTI (urinary tract infection)      Subjective:  Patient denies acute complaints, denies nausea, vomiting, fevers, chills, night sweats.      HPI: Devika Ashby is a 66 y.o. female with PMHx significant for HTN, HLD, DMII, CVA, severe malnutrition, and medical noncompliance who presents from [facility] for evaluation for decreased appetite. The patient reports she has had decreased appetite for the last several months which has worsened in the last few weeks. She notes continued weight loss since discharge from the hospital last month. Upon review of records she was admitted to [facility] [place] for severe malnutrition and debility and discharged to [facility]. She endorses intermittent nausea but denies any vomiting or diarrhea. She reports fatigue, generalized weakness, and intermittent abdominal cramping. The patient also notes a productive cough beginning last week and tested positive for COVID-19 on Sunday. She denies any shortness of breath, fever, chills, chest pain, dysuria, or urinary frequency.     In the ED, VSSAF. CBC with WBC 3.38k. CMP with hypokalemia, low protein, and hypoalbuminemia. Lactate WNL. UA with 15 RBCs, 25 WBCs, occasional WBC clumps, and few bacteria. CXR concerning for possible new infiltrate right lower lung zone. The patient also tested positive again this evening for COVID-19. The patient received zofran, 1L LR, rocephin, and 50meq KCL replacement.    Inpatient Medications:    Current Facility-Administered Medications:     acetaminophen tablet 650 mg, 650 mg, Oral, Q6H PRN, Nicole Armijo NP    albuterol inhaler 2 puff, 2 puff, Inhalation, Q6H PRN, Nicole Arimjo NP    albuterol inhaler 2 puff,  2 puff, Inhalation, Q6H, 2 puff at 01/22/22 0741 **AND** MDI Q6H, , , Q6H, Nicole Armijo NP    amLODIPine tablet 10 mg, 10 mg, Oral, QHS, Nicole Armijo NP    ascorbic acid (vitamin C) tablet 250 mg, 250 mg, Oral, Daily, Nicole Armijo NP, 250 mg at 01/22/22 0933    aspirin tablet 325 mg, 325 mg, Oral, Daily, Nicole Armijo NP, 325 mg at 01/22/22 0933    benzonatate capsule 100 mg, 100 mg, Oral, TID PRN, Nicole Armijo NP    cefTRIAXone (ROCEPHIN) 1 g/50 mL D5W IVPB, 1 g, Intravenous, Q24H, Nicole Armijo NP    cyanocobalamin tablet 250 mcg, 250 mcg, Oral, Daily, Nicole Armijo NP, 250 mcg at 01/22/22 0933    dextrose 50% injection 12.5 g, 12.5 g, Intravenous, PRN, Nicole Armijo NP    dextrose 50% injection 25 g, 25 g, Intravenous, PRN, Nicole Armijo NP    diphenhydrAMINE capsule 50 mg, 50 mg, Oral, Q6H PRN, Nicole Armijo NP    enoxaparin injection 30 mg, 30 mg, Subcutaneous, Q24H, Nicole Armijo NP    fluticasone propionate 50 mcg/actuation nasal spray 100 mcg, 2 spray, Each Nostril, Daily, Nicole Armijo NP    glucagon (human recombinant) injection 1 mg, 1 mg, Intramuscular, PRN, Nicole Armijo NP    glucose chewable tablet 16 g, 16 g, Oral, PRN, Nicole Armijo NP    glucose chewable tablet 24 g, 24 g, Oral, PRN, Nicole Armijo NP    insulin aspart U-100 pen 0-5 Units, 0-5 Units, Subcutaneous, QID (AC + HS) PRN, Nicole Armijo NP, 2 Units at 01/22/22 1043    melatonin tablet 6 mg, 6 mg, Oral, Nightly PRN, Nicole Armijo NP    metoclopramide HCl injection 10 mg, 10 mg, Intravenous, Q6H PRN, Nicole Armijo, NP, 10 mg at 01/22/22 0933    pantoprazole EC tablet 40 mg, 40 mg, Oral, Daily, Nicole Armijo, NP, 40 mg at 01/22/22 0933    potassium bicarbonate disintegrating tablet 35 mEq, 35 mEq, Oral, PRN, Nicole Armijo, NP    potassium bicarbonate disintegrating tablet 50 mEq, 50 mEq, Oral, PRN, Nicole Armijo, NP     "potassium bicarbonate disintegrating tablet 60 mEq, 60 mEq, Oral, PRN, Nicole Armijo, NP    pyridoxine (vitamin B6) tablet 50 mg, 50 mg, Oral, Daily, Nicole Armijo NP, 50 mg at 01/22/22 0933    sodium chloride 0.65 % nasal spray 1 spray, 1 spray, Each Nostril, PRN, Nicole Armijo NP    sodium chloride 0.9% flush 10 mL, 10 mL, Intravenous, PRN, Nicole Armijo NP    vitamin E capsule 400 Units, 400 Units, Oral, Daily, Nicole Armijo NP, 400 Units at 01/22/22 0933      Physical Exam:      Intake/Output Summary (Last 24 hours) at 1/22/2022 1158  Last data filed at 1/22/2022 0400  Gross per 24 hour   Intake 1386.02 ml   Output 100 ml   Net 1286.02 ml     Wt Readings from Last 3 Encounters:   01/22/22 34.7 kg (76 lb 6.4 oz)   12/12/21 39.2 kg (86 lb 6.7 oz)   06/02/20 55 kg (121 lb 4.1 oz)       BP (!) 116/58 (BP Location: Left arm, Patient Position: Lying)   Pulse 105   Temp 99 °F (37.2 °C) (Oral)   Resp 20   Ht 5' 2" (1.575 m)   Wt 34.7 kg (76 lb 6.4 oz)   LMP  (LMP Unknown)   SpO2 95%   Breastfeeding No   BMI 13.97 kg/m²     GEN: NAD, conversant  Resp: coarse bilateral breath sounds, no wheezes or rales, normal work of breathing   CV: RRR, no m/r/g, no edema  GI: soft, NTND  Skin: no rash  Neuro: AAOx3, CN grossly intact, no focal neurologic deficits    Laboratory:  Lab Results   Component Value Date    [id number] Positive (A) 01/21/2022       Recent Labs   Lab 01/21/22  1303 01/22/22  0423   WBC 3.38* 4.29   LYMPH 35.8  1.2 20.0  0.9*   HGB 13.0 13.5   HCT 38.0 42.2    277     Recent Labs   Lab 01/21/22  1303 01/22/22  0423   * 135*   K 2.8* 3.6   CL 97 98   CO2 25 25   BUN 12 9   CREATININE 0.6 0.5   * 187*   CALCIUM 8.1* 8.6*   MG 1.7 1.7   LIPASE 9  --      Recent Labs   Lab 01/21/22  1303 01/21/22  2113 01/22/22  0423   ALKPHOS 61  --  65   ALT 22  --  21   AST 22  --  23   ALBUMIN 2.7*  --  2.8*   PROT 5.6*  --  5.8*   BILITOT 0.6  --  0.5   INR  --  0.9  " --         Recent Labs     01/21/22 2112 01/22/22  0843   DDIMER  --  0.73*   FERRITIN 799*  --      --    *  --    TROPONINI 0.020  --    CPK 24  --        All labs within the last 24 hours were reviewed.     Microbiology:  Microbiology Results (last 7 days)     Procedure Component Value Units Date/Time    Blood Culture #1 **CANNOT BE ORDERED STAT** [[id number]] Collected: 01/21/22 1805    Order Status: Completed Specimen: Blood from Peripheral, Antecubital, Left Updated: 01/22/22 1006     Blood Culture, Routine No growth to date    Blood Culture #2 **CANNOT BE ORDERED STAT** [[id number]] Collected: 01/21/22 1812    Order Status: Completed Specimen: Blood from Peripheral, Hand, Left Updated: 01/22/22 1006     Blood Culture, Routine No growth to date    Culture, Respiratory with Gram Stain [[id number]] Collected: 01/22/22 0755    Order Status: Sent Specimen: Respiratory from Sputum Updated: 01/22/22 0844    Urine culture [[id number]] Collected: 01/21/22 1645    Order Status: No result Specimen: Urine Updated: 01/21/22 1703            Imaging  ECG Results          EKG 12-lead (In process)  Result time 01/21/22 15:35:51    In process by Interface, Lab In [facility] (01/21/22 15:35:51)                 Narrative:    Test Reason : E87.6,    Vent. Rate : 077 BPM     Atrial Rate : 077 BPM     P-R Int : 170 ms          QRS Dur : 106 ms      QT Int : 318 ms       P-R-T Axes : 068 003 -65 degrees     QTc Int : 359 ms    Normal sinus rhythm  Cannot rule out Anterior infarct ,age undetermined  ST and T wave abnormality, consider inferior ischemia  Abnormal ECG  When compared with ECG of 13-DEC-2021 17:14,  Inverted T waves have replaced nonspecific T wave abnormality in Inferior  leads  QT has shortened    Referred By: [email or web address]   SELF           Confirmed By:                               No results found for this or any previous visit.      X-Ray Chest AP Portable  Narrative: EXAMINATION:  XR CHEST AP  PORTABLE    CLINICAL HISTORY:  Weakness    TECHNIQUE:  Single frontal view of the chest was performed.    COMPARISON:  04/15/2016    FINDINGS:  Again seen, loop recorder projected over the left medial chest.    The heart size is normal.  There is mild calcification of the aortic knob consistent with atherosclerotic stigmata.    There are new patchy airspace opacities at the right base.  Although portions are linear and may represent subsegmental atelectasis, some have a more ill-defined appearance and are therefore concerning for new infiltrate.  Impression: Possible new infiltrate right lower lung zone.    Electronically signed by: Clarissa Burrell  Date:    01/21/2022  Time:    13:53      All imaging within the last 24 hours was reviewed.     Assessment and Plan:    Active Hospital Problems    Diagnosis  POA    *UTI (urinary tract infection) [N39.0]  Yes    Pneumonia of right lower lobe due to infectious organism [J18.9]  Yes    COVID-19 virus infection [U07.1]  Yes    Severe malnutrition [E43]  Yes     Nutrition Problem:  Severe Protein-Calorie Malnutrition  Malnutrition in the context of Chronic Illness/Injury    Related to (etiology):  Inability to consume sufficient energy  Food- and nutrition-related knowledge deficit    Signs and Symptoms (as evidenced by):  Energy Intake: <75% of estimated energy requirement for 2 years  Body Fat Depletion: moderate and severe depletion of orbitals, triceps and thoracic and lumbar region   Muscle Mass Depletion: moderate and severe depletion of temples, clavicle region, scapular region, interosseous muscle and lower extremities     Interventions(treatment strategy):  Collaboration with other providers  [employer] D[employer]  Nutrition education    Nutrition Diagnosis Status:  New        Hypokalemia [E87.6]  Yes    Weakness [R53.1]  Yes    Noncompliance with medication regimen [Z91.14]  Not Applicable    Hypertension, essential [I10]  Yes    Hyperlipidemia [E78.5]  Yes     Type 2 diabetes mellitus with complication, without long-term current use of insulin [E11.8]  Yes      Resolved Hospital Problems   No resolved problems to display.         UTI (urinary tract infection)  -UA reviewed, follow cx.  -Started on rocephin in the ED, continue for now.     COVID-19 virus infection  Patient is not hypoxic or tachypneic. She complains of a cough, decreased appetite, nausea, and fatigue. Patient was initially COVID + on 1/16 and was positive again today. Does not meet criteria for decadron or remdesivir.      - COVID-19 testing   - Infection Control notified      - Isolation:   - Airborne, Contact and Droplet Precautions  - Cohort patients into COVID units  - N95 mask, wear eye protection  - 20 second hand hygiene              - Limit visitors per hospital policy              - Consolidating lab draws, nursing care, provider visits, and interventions     - Diagnostics: (leukopenia, hyponatremia, hyperferritinemia, elevated troponin, elevated d-dimer, age, and comorbidities are significant predictors of poor clinical outcome)  CBC, CMP, Procalcitonin, Ferritin, CRP, LDH, BNP, Troponin, Portable CXR and UA and culture     - Management:  Supplemental O2 to maintain SpO2 >92%  Telemetry  Continuous/intermittent Pulse Ox  Albuterol treatment PRN     Pneumonia of right lower lobe due to infectious organism  Patient c/o productive cough x1 week. Tested positive for COVID on 1/16 and again today. Patient is not hypoxic or tachypneic.  -1/4 SIRS  -CXR concerning for possible new infiltrate right lower lung zone.  -Started on rocephin in the ED, continue for now. Has PCN allergy, doxycycline allergy, and history of myasthenia gravis so was not started on azithromycin.  -Sputum cx  -Albuterol inhaler PRN     Severe malnutrition  Body mass index (BMI) less than 19  Nutrition consulted. Body mass index is 13.97 kg/m².. Encourage maximal PO intake. Diet supplementation ordered per nutrition approval.  Will encourage PO and monitor closely for weight changes.     Hypokalemia, resolved  -replaced   -continue to monitor.     Weakness  -Likely due to infection and severe malnutrition.  -PT/OT  - high risk of fall / injury utilize all safety measures and fall precautions      Noncompliance with medication regimen  -Long history of refusal of most medications including cholesterol medications and diabetic medications.  -Continue to educate patient of importance of medication compliance.     Hyperlipidemia  - refuses to take statin as she states it gives her body aches. I offered to change her medication as that is a common side effect, and she refused     Hypertension, essential  -Currently normotensive.  -Continue norvasc 10mg nightly     Type 2 diabetes mellitus with complication, without long-term current use of insulin  - has refused metformin and insulin in the past  - glucose 166 on admit  - Last Hgb a1c 9.3  - started on LDSSI, though patient will likely refuse it  - Accuchecks ACHS  - hypoglycemic protocol  - diabetic diet           VTE High Risk Prophylaxis:   VTE Risk Mitigation (From admission, onward)         Ordered     enoxaparin injection 30 mg  Every 24 hours (non-standard times)         01/21/22 1920                  Discharge Planning   PRIYANKA: 1/24/2022     Code Status: Full Code   Is the patient medically ready for discharge?: No               Perico Mc MD  [facility]

## 2022-01-22 NOTE — ASSESSMENT & PLAN NOTE
Patient has hypokalemia which is currently uncontrolled. Last electrolytes reviewed- Recent Labs   Lab 01/21/22  1303   K 2.8*   . Will replace potassium and monitor electrolytes closely. Continuous telemetry.

## 2022-01-22 NOTE — H&P
[facility] - Telemetry [facility] ([address] Medicine  History & Physical    Patient Name: Devika Ashby  MRN: [id number]  Patient Class: OP- Observation  Admission Date: 1/21/2022  Attending Physician: Peterson Magallanes MD   Primary Care Provider: Jeff Gaytan MD         Patient information was obtained from patient, past medical records and ER records.     Subjective:     Principal Problem:UTI (urinary tract infection)    Chief Complaint:   Chief Complaint   Patient presents with    COVID-19 Concerns     Pt came in from [facility] and Covid + x 1 week. Pt reported not eating and drinking x 1week and N/V the patient also has not been taking her medication         HPI: Devika Ashby is a 66 y.o. female with PMHx significant for HTN, HLD, DMII, CVA, severe malnutrition, and medical noncompliance who presents from [facility] for evaluation for decreased appetite. The patient reports she has had decreased appetite for the last several months which has worsened in the last few weeks. She notes continued weight loss since discharge from the hospital last month. Upon review of records she was admitted to [facility] in December for severe malnutrition and debility and discharged to [facility]. She endorses intermittent nausea but denies any vomiting or diarrhea. She reports fatigue, generalized weakness, and intermittent abdominal cramping. The patient also notes a productive cough beginning last week and tested positive for COVID-19 on Sunday. She denies any shortness of breath, fever, chills, chest pain, dysuria, or urinary frequency.    In the ED, VSSAF. CBC with WBC 3.38k. CMP with hypokalemia, low protein, and hypoalbuminemia. Lactate WNL. UA with 15 RBCs, 25 WBCs, occasional WBC clumps, and few bacteria. CXR concerning for possible new infiltrate right lower lung zone. The patient also tested positive again this evening for COVID-19. The patient received zofran, 1L LR, rocephin, and 50meq KCL  replacement.      Past Medical History:   Diagnosis Date    CVA (cerebral infarction)     Diabetes mellitus     HLD (hyperlipidemia)     Hypertension     Myasthenia gravis     Severe malnutrition 12/13/2021    Nutrition Problem: Severe Protein-Calorie Malnutrition Malnutrition in the context of Chronic Illness/Injury  Related to (etiology): Inability to consume sufficient energy Food- and nutrition-related knowledge deficit  Signs and Symptoms (as evidenced by): Energy Intake: <75% of estimated energy requirement for 2 years Body Fat Depletion: moderate and severe depletion of orbitals, triceps and thor       Past Surgical History:   Procedure Laterality Date    bladder lift      BONE MARROW BIOPSY      CHOLECYSTECTOMY      HYSTERECTOMY      PITUITARY SURGERY         Review of patient's allergies indicates:   Allergen Reactions    Dequincy     Apple     Avelox [moxifloxacin]     Barley     Coconut     Corn containing products Other (See Comments)     Nausea and hives    Diphtheria-tetanus-pertuss vac     Doxycycline     Fish containing products     Green bean     Hazelnut     Honey     Insulins     Milk containing products     Nutmeg     Pcn [penicillins]     Pistachio nut     Shellfish containing products     Succinylcholine     Wheat containing prod        No current facility-administered medications on file prior to encounter.     Current Outpatient Medications on File Prior to Encounter   Medication Sig    amLODIPine (NORVASC) 10 MG tablet Take 1 tablet (10 mg total) by mouth every evening.    ascorbic acid (VITAMIN C) 250 MG tablet Take 250 mg by mouth once daily.    aspirin 325 MG tablet Take 325 mg by mouth once daily.    b complex vitamins tablet Take 1 tablet by mouth once daily.    blood sugar diagnostic Strp Use as directed 3-4 times daily to test blood glucose    blood-glucose meter kit Use as directed 3-4 times daily to test blood glucose    cyanocobalamin (VITAMIN  "B-12) 100 MCG tablet Take 100 mcg by mouth once daily.    diphenhydrAMINE (BENADRYL) 50 MG capsule Take 1 capsule (50 mg total) by mouth every 6 (six) hours as needed for Allergies.    fluticasone (FLONASE) 50 mcg/actuation nasal spray 2 sprays by Each Nare route once daily.    ibuprofen (ADVIL,MOTRIN) 200 MG tablet Take 200 mg by mouth every 6 (six) hours as needed for Pain.    lancets Misc Use as directed 3-4 times daily to test blood glucose    mupirocin (BACTROBAN) 2 % ointment Apply topically 3 (three) times daily.    omeprazole (PRILOSEC) 10 MG capsule Take 10 mg by mouth once daily.    pen needle, diabetic 32 gauge x 5/32" Ndle Use as directed 4 times daily to inject insulin    pyridoxine, vitamin B6, (VITAMIN B-6) 100 MG Tab Take 50 mg by mouth once daily.    sodium chloride (OCEAN) 0.65 % nasal spray 1 spray by Nasal route as needed (irritation).    tretinoin (RETIN-A) 0.05 % cream Apply topically every evening.    vitamin E 100 UNIT capsule Take 100 Units by mouth once daily.     Family History     Problem Relation (Age of Onset)    Alcohol abuse Father    Arthritis Mother    Cancer Mother, Father    Diabetes Father    Drug abuse Father    Hearing loss Mother    Heart disease Mother    Hyperlipidemia Mother    Hypertension Mother    Kidney disease Sister    Stroke Mother        Tobacco Use    Smoking status: Never Smoker    Smokeless tobacco: Never Used   Substance and Sexual Activity    Alcohol use: No    Drug use: Not on file    Sexual activity: Not on file     Review of Systems   Constitutional: Positive for appetite change (decreased), fatigue and unexpected weight change (weight loss). Negative for activity change, chills, diaphoresis and fever.   HENT: Negative for congestion, postnasal drip, rhinorrhea and sore throat.    Eyes: Negative for photophobia and visual disturbance.   Respiratory: Positive for cough. Negative for chest tightness, shortness of breath and wheezing.  "   Cardiovascular: Negative for chest pain, palpitations and leg swelling.   Gastrointestinal: Positive for nausea. Negative for abdominal distention, abdominal pain, diarrhea and vomiting.   Genitourinary: Negative for dysuria, flank pain, frequency and hematuria.   Musculoskeletal: Negative for arthralgias, gait problem, myalgias and neck pain.   Skin: Negative for color change, pallor, rash and wound.   Neurological: Positive for weakness (generalized). Negative for dizziness, syncope, light-headedness and headaches.   Psychiatric/Behavioral: Negative for agitation, confusion and hallucinations. The patient is not nervous/anxious.      Objective:     Vital Signs (Most Recent):  Temp: 98.3 °F (36.8 °C) (01/21/22 1202)  Pulse: 80 (01/21/22 1804)  Resp: 16 (01/21/22 1804)  BP: 112/75 (01/21/22 1802)  SpO2: 97 % (01/21/22 1804) Vital Signs (24h Range):  Temp:  [98.3 °F (36.8 °C)] 98.3 °F (36.8 °C)  Pulse:  [77-89] 80  Resp:  [14-18] 16  SpO2:  [96 %-98 %] 97 %  BP: (112-142)/(74-81) 112/75        There is no height or weight on file to calculate BMI.    Physical Exam  Vitals and nursing note reviewed.   Constitutional:       General: She is not in acute distress.     Appearance: She is cachectic. She is not toxic-appearing or diaphoretic.   HENT:      Head: Normocephalic and atraumatic.      Nose: Nose normal.      Mouth/Throat:      Mouth: Mucous membranes are dry.      Dentition: Abnormal dentition (edentulous).   Eyes:      Pupils: Pupils are equal, round, and reactive to light.   Cardiovascular:      Rate and Rhythm: Normal rate and regular rhythm.      Heart sounds: No murmur heard.      Pulmonary:      Effort: No respiratory distress.      Breath sounds: No wheezing, rhonchi or rales.   Abdominal:      General: Bowel sounds are normal. There is no distension.      Tenderness: There is no abdominal tenderness. There is no guarding.   Genitourinary:     Comments: deferred  Musculoskeletal:         General: No  swelling, tenderness or deformity. Normal range of motion.      Cervical back: Normal range of motion. No tenderness.   Skin:     General: Skin is warm and dry.      Capillary Refill: Capillary refill takes less than 2 seconds.   Neurological:      General: No focal deficit present.      Mental Status: She is alert and oriented to person, place, and time.      Motor: Weakness (generalized) present.   Psychiatric:         Mood and Affect: Mood normal.         Behavior: Behavior normal. Behavior is cooperative.           CRANIAL NERVES     CN III, IV, VI   Pupils are equal, round, and reactive to light.       Significant Labs:   All pertinent labs within the past 24 hours have been reviewed.  CBC:   Recent Labs   Lab 01/21/22  1303   WBC 3.38*   HGB 13.0   HCT 38.0        CMP:   Recent Labs   Lab 01/21/22  1303   *   K 2.8*   CL 97   CO2 25   *   BUN 12   CREATININE 0.6   CALCIUM 8.1*   PROT 5.6*   ALBUMIN 2.7*   BILITOT 0.6   ALKPHOS 61   AST 22   ALT 22   ANIONGAP 13   EGFRNONAA >60.0     Lactic Acid:   Recent Labs   Lab 01/21/22  1303   LACTATE 1.0     Urine Studies:   Recent Labs   Lab 01/21/22  1645   COLORU Joyce   APPEARANCEUA Cloudy*   PHUR 6.0   SPECGRAV 1.020   PROTEINUA 1+*   GLUCUA 3+*   KETONESU Trace*   BILIRUBINUA Negative   OCCULTUA Negative   NITRITE Negative   LEUKOCYTESUR Negative   RBCUA 15*   WBCUA 25*   BACTERIA Few*   SQUAMEPITHEL 2   HYALINECASTS 0       Significant Imaging: I have reviewed all pertinent imaging results/findings within the past 24 hours.    Imaging Results          X-Ray Chest AP Portable (Final result)  Result time 01/21/22 13:53:11    Final result by Clarissa Burrell MD (01/21/22 13:53:11)                 Impression:      Possible new infiltrate right lower lung zone.      Electronically signed by: Clarissa Burrell  Date:    01/21/2022  Time:    13:53             Narrative:    EXAMINATION:  XR CHEST AP PORTABLE    CLINICAL  HISTORY:  Weakness    TECHNIQUE:  Single frontal view of the chest was performed.    COMPARISON:  04/15/2016    FINDINGS:  Again seen, loop recorder projected over the left medial chest.    The heart size is normal.  There is mild calcification of the aortic knob consistent with atherosclerotic stigmata.    There are new patchy airspace opacities at the right base.  Although portions are linear and may represent subsegmental atelectasis, some have a more ill-defined appearance and are therefore concerning for new infiltrate.                                  Assessment/Plan:     * UTI (urinary tract infection)  -UA reviewed, follow cx.  -Started on rocephin in the ED, continue for now.    COVID-19 virus infection  Patient is not hypoxic or tachypneic. She complains of a cough, decreased appetite, nausea, and fatigue. Patient was initially COVID + on 1/16 and was positive again today. Does not meet criteria for decadron or remdesivir.     - COVID-19 testing   - Infection Control notified     - Isolation:   - Airborne, Contact and Droplet Precautions  - Cohort patients into COVID units  - N95 mask, wear eye protection  - 20 second hand hygiene              - Limit visitors per hospital policy              - Consolidating lab draws, nursing care, provider visits, and interventions    - Diagnostics: (leukopenia, hyponatremia, hyperferritinemia, elevated troponin, elevated d-dimer, age, and comorbidities are significant predictors of poor clinical outcome)  CBC, CMP, Procalcitonin, Ferritin, CRP, LDH, BNP, Troponin, Portable CXR and UA and culture    - Management:  Supplemental O2 to maintain SpO2 >92%  Telemetry  Continuous/intermittent Pulse Ox  Albuterol treatment PRN    Pneumonia of right lower lobe due to infectious organism  Patient c/o productive cough x1 week. Tested positive for COVID on 1/16 and again today. Patient is not hypoxic or tachypneic.  -1/4 SIRS  -CXR concerning for possible new infiltrate right lower  lung zone.  -Started on rocephin in the ED, continue for now. Has PCN allergy, doxycycline allergy, and history of myasthenia gravis so was not started on azithromycin.  -Sputum cx  -Albuterol inhaler PRN    Severe malnutrition  Body mass index (BMI) less than 19  Nutrition consulted. Body mass index is 13.97 kg/m².. Encourage maximal PO intake. Diet supplementation ordered per nutrition approval. Will encourage PO and monitor closely for weight changes.    Hypokalemia  Patient has hypokalemia which is currently uncontrolled. Last electrolytes reviewed- Recent Labs   Lab 01/21/22  1303   K 2.8*   . Will replace potassium and monitor electrolytes closely. Continuous telemetry.    Weakness  -Likely due to infection and severe malnutrition.  -PT/OT  - high risk of fall / injury utilize all safety measures and fall precautions     Noncompliance with medication regimen  -Long history of refusal of most medications including cholesterol medications and diabetic medications.  -Continue to educate patient of importance of medication compliance.    Hyperlipidemia  - refuses to take statin as she states it gives her body aches. I offered to change her medication as that is a common side effect, and she refused    Hypertension, essential  -Currently normotensive.  -Continue norvasc 10mg nightly    Type 2 diabetes mellitus with complication, without long-term current use of insulin  - has refused metformin and insulin in the past  - glucose 166 on admit  - Last Hgb a1c 9.3  - started on LDSSI, though patient will likely refuse it  - Accuchecks ACHS  - hypoglycemic protocol  - diabetic diet    VTE Risk Mitigation (From admission, onward)         Ordered     enoxaparin injection 30 mg  Every 24 hours (non-standard times)         01/21/22 1920                   Nicole Armijo NP  Department of Hospital Medicine   [facility] - Telemetry Stepdown ([place]

## 2022-01-22 NOTE — ASSESSMENT & PLAN NOTE
- refuses to take statin as she states it gives her body aches. I offered to change her medication as that is a common side effect, and she refused

## 2022-01-22 NOTE — ASSESSMENT & PLAN NOTE
- has refused metformin and insulin in the past  - glucose 166 on admit  - Last Hgb a1c 9.3  - started on LDSSI, though patient will likely refuse it  - Accuchecks ACHS  - hypoglycemic protocol  - diabetic diet

## 2022-01-22 NOTE — SUBJECTIVE & OBJECTIVE
Past Medical History:   Diagnosis Date    CVA (cerebral infarction)     Diabetes mellitus     HLD (hyperlipidemia)     Hypertension     Myasthenia gravis     Severe malnutrition 12/13/2021    Nutrition Problem: Severe Protein-Calorie Malnutrition Malnutrition in the context of Chronic Illness/Injury  Related to (etiology): Inability to consume sufficient energy Food- and nutrition-related knowledge deficit  Signs and Symptoms (as evidenced by): Energy Intake: <75% of estimated energy requirement for 2 years Body Fat Depletion: moderate and severe depletion of orbitals, triceps and thor       Past Surgical History:   Procedure Laterality Date    bladder lift      BONE MARROW BIOPSY      CHOLECYSTECTOMY      HYSTERECTOMY      PITUITARY SURGERY         Review of patient's allergies indicates:   Allergen Reactions    [place]     Apple     Avelox [moxifloxacin]     Barley     Coconut     Corn containing products Other (See Comments)     Nausea and hives    Diphtheria-tetanus-pertuss vac     Doxycycline     Fish containing products     Green bean     Hazelnut     Honey     Insulins     Milk containing products     Nutmeg     Pcn [penicillins]     Pistachio nut     Shellfish containing products     Succinylcholine     Wheat containing prod        No current facility-administered medications on file prior to encounter.     Current Outpatient Medications on File Prior to Encounter   Medication Sig    amLODIPine (NORVASC) 10 MG tablet Take 1 tablet (10 mg total) by mouth every evening.    ascorbic acid (VITAMIN C) 250 MG tablet Take 250 mg by mouth once daily.    aspirin 325 MG tablet Take 325 mg by mouth once daily.    b complex vitamins tablet Take 1 tablet by mouth once daily.    blood sugar diagnostic Strp Use as directed 3-4 times daily to test blood glucose    blood-glucose meter kit Use as directed 3-4 times daily to test blood glucose    cyanocobalamin (VITAMIN B-12) 100 MCG tablet  "Take 100 mcg by mouth once daily.    diphenhydrAMINE (BENADRYL) 50 MG capsule Take 1 capsule (50 mg total) by mouth every 6 (six) hours as needed for Allergies.    fluticasone (FLONASE) 50 mcg/actuation nasal spray 2 sprays by Each Nare route once daily.    ibuprofen (ADVIL,MOTRIN) 200 MG tablet Take 200 mg by mouth every 6 (six) hours as needed for Pain.    lancets Misc Use as directed 3-4 times daily to test blood glucose    mupirocin (BACTROBAN) 2 % ointment Apply topically 3 (three) times daily.    omeprazole (PRILOSEC) 10 MG capsule Take 10 mg by mouth once daily.    pen needle, diabetic 32 gauge x 5/32" Ndle Use as directed 4 times daily to inject insulin    pyridoxine, vitamin B6, (VITAMIN B-6) 100 MG Tab Take 50 mg by mouth once daily.    sodium chloride (OCEAN) 0.65 % nasal spray 1 spray by Nasal route as needed (irritation).    tretinoin (RETIN-A) 0.05 % cream Apply topically every evening.    vitamin E 100 UNIT capsule Take 100 Units by mouth once daily.     Family History     Problem Relation (Age of Onset)    Alcohol abuse Father    Arthritis Mother    Cancer Mother, Father    Diabetes Father    Drug abuse Father    Hearing loss Mother    Heart disease Mother    Hyperlipidemia Mother    Hypertension Mother    Kidney disease Sister    Stroke Mother        Tobacco Use    Smoking status: Never Smoker    Smokeless tobacco: Never Used   Substance and Sexual Activity    Alcohol use: No    Drug use: Not on file    Sexual activity: Not on file     Review of Systems   Constitutional: Positive for appetite change (decreased), fatigue and unexpected weight change (weight loss). Negative for activity change, chills, diaphoresis and fever.   HENT: Negative for congestion, postnasal drip, rhinorrhea and sore throat.    Eyes: Negative for photophobia and visual disturbance.   Respiratory: Positive for cough. Negative for chest tightness, shortness of breath and wheezing.    Cardiovascular: Negative for " chest pain, palpitations and leg swelling.   Gastrointestinal: Positive for nausea. Negative for abdominal distention, abdominal pain, diarrhea and vomiting.   Genitourinary: Negative for dysuria, flank pain, frequency and hematuria.   Musculoskeletal: Negative for arthralgias, gait problem, myalgias and neck pain.   Skin: Negative for color change, pallor, rash and wound.   Neurological: Positive for weakness (generalized). Negative for dizziness, syncope, light-headedness and headaches.   Psychiatric/Behavioral: Negative for agitation, confusion and hallucinations. The patient is not nervous/anxious.      Objective:     Vital Signs (Most Recent):  Temp: 98.3 °F (36.8 °C) (01/21/22 1202)  Pulse: 80 (01/21/22 1804)  Resp: 16 (01/21/22 1804)  BP: 112/75 (01/21/22 1802)  SpO2: 97 % (01/21/22 1804) Vital Signs (24h Range):  Temp:  [98.3 °F (36.8 °C)] 98.3 °F (36.8 °C)  Pulse:  [77-89] 80  Resp:  [14-18] 16  SpO2:  [96 %-98 %] 97 %  BP: (112-142)/(74-81) 112/75        There is no height or weight on file to calculate BMI.    Physical Exam  Vitals and nursing note reviewed.   Constitutional:       General: She is not in acute distress.     Appearance: She is cachectic. She is not toxic-appearing or diaphoretic.   HENT:      Head: Normocephalic and atraumatic.      Nose: Nose normal.      Mouth/Throat:      Mouth: Mucous membranes are dry.      Dentition: Abnormal dentition (edentulous).   Eyes:      Pupils: Pupils are equal, round, and reactive to light.   Cardiovascular:      Rate and Rhythm: Normal rate and regular rhythm.      Heart sounds: No murmur heard.      Pulmonary:      Effort: No respiratory distress.      Breath sounds: No wheezing, rhonchi or rales.   Abdominal:      General: Bowel sounds are normal. There is no distension.      Tenderness: There is no abdominal tenderness. There is no guarding.   Genitourinary:     Comments: deferred  Musculoskeletal:         General: No swelling, tenderness or deformity.  Normal range of motion.      Cervical back: Normal range of motion. No tenderness.   Skin:     General: Skin is warm and dry.      Capillary Refill: Capillary refill takes less than 2 seconds.   Neurological:      General: No focal deficit present.      Mental Status: She is alert and oriented to person, place, and time.      Motor: Weakness (generalized) present.   Psychiatric:         Mood and Affect: Mood normal.         Behavior: Behavior normal. Behavior is cooperative.           CRANIAL NERVES     CN III, IV, VI   Pupils are equal, round, and reactive to light.       Significant Labs:   All pertinent labs within the past 24 hours have been reviewed.  CBC:   Recent Labs   Lab 01/21/22  1303   WBC 3.38*   HGB 13.0   HCT 38.0        CMP:   Recent Labs   Lab 01/21/22  1303   *   K 2.8*   CL 97   CO2 25   *   BUN 12   CREATININE 0.6   CALCIUM 8.1*   PROT 5.6*   ALBUMIN 2.7*   BILITOT 0.6   ALKPHOS 61   AST 22   ALT 22   ANIONGAP 13   EGFRNONAA >60.0     Lactic Acid:   Recent Labs   Lab 01/21/22  1303   LACTATE 1.0     Urine Studies:   Recent Labs   Lab 01/21/22  1645   COLORU Joyce   APPEARANCEUA Cloudy*   PHUR 6.0   SPECGRAV 1.020   PROTEINUA 1+*   GLUCUA 3+*   KETONESU Trace*   BILIRUBINUA Negative   OCCULTUA Negative   NITRITE Negative   LEUKOCYTESUR Negative   RBCUA 15*   WBCUA 25*   BACTERIA Few*   SQUAMEPITHEL 2   HYALINECASTS 0       Significant Imaging: I have reviewed all pertinent imaging results/findings within the past 24 hours.    Imaging Results          X-Ray Chest AP Portable (Final result)  Result time 01/21/22 13:53:11    Final result by Clarissa Burrell MD (01/21/22 13:53:11)                 Impression:      Possible new infiltrate right lower lung zone.      Electronically signed by: Clarissa Burrell  Date:    01/21/2022  Time:    13:53             Narrative:    EXAMINATION:  XR CHEST AP PORTABLE    CLINICAL HISTORY:  Weakness    TECHNIQUE:  Single frontal view of the chest was  performed.    COMPARISON:  04/15/2016    FINDINGS:  Again seen, loop recorder projected over the left medial chest.    The heart size is normal.  There is mild calcification of the aortic knob consistent with atherosclerotic stigmata.    There are new patchy airspace opacities at the right base.  Although portions are linear and may represent subsegmental atelectasis, some have a more ill-defined appearance and are therefore concerning for new infiltrate.

## 2022-01-22 NOTE — ASSESSMENT & PLAN NOTE
Body mass index (BMI) less than 19  Nutrition consulted. Body mass index is 13.97 kg/m².. Encourage maximal PO intake. Diet supplementation ordered per nutrition approval. Will encourage PO and monitor closely for weight changes.

## 2022-01-22 NOTE — NURSING
Called pt's dtr Grace to provide status update. Pt continues to refuse PO intake - will con't to offer/push. VS stable. No complaints voiced. Dtr advised pt has DNR on file @ [facility] - pt currently full code. She also advised pt does NOT have pmh of myasthenia gravis or h/o pituitary surgery. Dtr will visit pt in the am after work. ROBIN.

## 2022-01-22 NOTE — HPI
Devika Ashby is a 66 y.o. female with PMHx significant for HTN, HLD, DMII, CVA, severe malnutrition, and medical noncompliance who presents from [facility] for evaluation for decreased appetite. The patient reports she has had decreased appetite for the last several months which has worsened in the last few weeks. She notes continued weight loss since discharge from the hospital last month. Upon review of records she was admitted to [facility] in December for severe malnutrition and debility and discharged to [facility]. She endorses intermittent nausea but denies any vomiting or diarrhea. She reports fatigue, generalized weakness, and intermittent abdominal cramping. The patient also notes a productive cough beginning last week and tested positive for COVID-19 on Sunday. She denies any shortness of breath, fever, chills, chest pain, dysuria, or urinary frequency.    In the ED, VSSAF. CBC with WBC 3.38k. CMP with hypokalemia, low protein, and hypoalbuminemia. Lactate WNL. UA with 15 RBCs, 25 WBCs, occasional WBC clumps, and few bacteria. CXR concerning for possible new infiltrate right lower lung zone. The patient also tested positive again this evening for COVID-19. The patient received zofran, 1L LR, rocephin, and 50meq KCL replacement.

## 2022-01-22 NOTE — PLAN OF CARE
Problem: Adult Inpatient Plan of Care  Goal: Plan of Care Review  Outcome: Ongoing, Progressing  Goal: Patient-Specific Goal (Individualized)  Outcome: Ongoing, Progressing  Goal: Absence of Hospital-Acquired Illness or Injury  Outcome: Ongoing, Progressing  Goal: Optimal Comfort and Wellbeing  Outcome: Ongoing, Progressing  Goal: Readiness for Transition of Care  Outcome: Ongoing, Progressing     Problem: Diabetes Comorbidity  Goal: Blood Glucose Level Within Targeted Range  Outcome: Ongoing, Progressing     Problem: Fluid Imbalance (Pneumonia)  Goal: Fluid Balance  Outcome: Ongoing, Progressing     Problem: Infection (Pneumonia)  Goal: Resolution of Infection Signs and Symptoms  Outcome: Ongoing, Progressing     Problem: Respiratory Compromise (Pneumonia)  Goal: Effective Oxygenation and Ventilation  Outcome: Ongoing, Progressing     Problem: Skin Injury Risk Increased  Goal: Skin Health and Integrity  Outcome: Ongoing, Progressing     Problem: Fall Injury Risk  Goal: Absence of Fall and Fall-Related Injury  Outcome: Ongoing, Progressing     Problem: UTI (Urinary Tract Infection)  Goal: Improved Infection Symptoms  Outcome: Ongoing, Progressing

## 2022-01-22 NOTE — ASSESSMENT & PLAN NOTE
-Long history of refusal of most medications including cholesterol medications and diabetic medications.  -Continue to educate patient of importance of medication compliance.

## 2022-01-22 NOTE — PT/OT/SLP PROGRESS
Physical Therapy      Patient Name:  Devika Ashby   MRN:  [id number]    PT eval orders received and acknowledge. Pt not evaluated today 2/2 adamant refusal despite max encouragement and education. Pt reported she had just eaten for the first time in 3 days and needed to wait for her strength to return. PT explained that mobilization is very importance in the process of regaining strength. Pt continued to refuse. Will follow-up as pt is medically appropriate.

## 2022-01-22 NOTE — ASSESSMENT & PLAN NOTE
-Likely due to infection and severe malnutrition.  -PT/OT  - high risk of fall / injury utilize all safety measures and fall precautions

## 2022-01-22 NOTE — PROVIDER PROGRESS NOTES - EMERGENCY DEPT.
Encounter Date: 1/21/2022    ED Physician Progress Notes        Physician Note:   Received patient at sign-out  Chest x-ray reveals right lower lobe pneumonia.  Urinalysis consistent with UTI.  Patient is able to tolerate p.o..  On repeat assessment she appears fatigued. VS within normal limits. Will administer ceftriaxone 2 g. No azithromycin due to myasthenia. She does not appear septic. Obs per case management.

## 2022-01-22 NOTE — PT/OT/SLP PROGRESS
Occupational Therapy  Missed Evaluation    Patient Name:  Devika Ashby   MRN:  [id number]    Patient not seen today secondary to Patient unwilling to participate,Patient ill (Comment) (Pt with adamant refusal of therapy evaluation on this date, stating she had just eaten for the first time in several days, and that she's feeling nauseated). Will follow-up as able.    1/22/2022

## 2022-01-23 LAB
BACTERIA UR CULT: NORMAL
BACTERIA UR CULT: NORMAL
FERRITIN SERPL-MCNC: 565 NG/ML (ref 20–300)
LDH SERPL L TO P-CCNC: 162 U/L (ref 110–260)
POCT GLUCOSE: 171 MG/DL (ref 70–110)
POCT GLUCOSE: 181 MG/DL (ref 70–110)
POCT GLUCOSE: 189 MG/DL (ref 70–110)

## 2022-01-23 PROCEDURE — 97535 SELF CARE MNGMENT TRAINING: CPT

## 2022-01-23 PROCEDURE — 25000003 PHARM REV CODE 250: Performed by: NURSE PRACTITIONER

## 2022-01-23 PROCEDURE — 97530 THERAPEUTIC ACTIVITIES: CPT

## 2022-01-23 PROCEDURE — 97161 PT EVAL LOW COMPLEX 20 MIN: CPT

## 2022-01-23 PROCEDURE — 11000001 HC ACUTE MED/SURG PRIVATE ROOM

## 2022-01-23 PROCEDURE — 83615 LACTATE (LD) (LDH) ENZYME: CPT | Performed by: NURSE PRACTITIONER

## 2022-01-23 PROCEDURE — 36415 COLL VENOUS BLD VENIPUNCTURE: CPT | Performed by: NURSE PRACTITIONER

## 2022-01-23 PROCEDURE — 94761 N-INVAS EAR/PLS OXIMETRY MLT: CPT

## 2022-01-23 PROCEDURE — 97165 OT EVAL LOW COMPLEX 30 MIN: CPT

## 2022-01-23 PROCEDURE — 94640 AIRWAY INHALATION TREATMENT: CPT

## 2022-01-23 PROCEDURE — 82728 ASSAY OF FERRITIN: CPT | Performed by: NURSE PRACTITIONER

## 2022-01-23 PROCEDURE — 99900035 HC TECH TIME PER 15 MIN (STAT)

## 2022-01-23 PROCEDURE — 27000207 HC ISOLATION

## 2022-01-23 PROCEDURE — 63600175 PHARM REV CODE 636 W HCPCS: Performed by: NURSE PRACTITIONER

## 2022-01-23 RX ADMIN — ALBUTEROL SULFATE 2 PUFF: 108 INHALANT RESPIRATORY (INHALATION) at 01:01

## 2022-01-23 RX ADMIN — CEFTRIAXONE 1 G: 1 INJECTION, POWDER, FOR SOLUTION INTRAMUSCULAR; INTRAVENOUS at 11:01

## 2022-01-23 RX ADMIN — Medication 400 UNITS: at 09:01

## 2022-01-23 RX ADMIN — PYRIDOXINE HCL TAB 50 MG 50 MG: 50 TAB at 09:01

## 2022-01-23 RX ADMIN — ASPIRIN 325 MG ORAL TABLET 325 MG: 325 PILL ORAL at 09:01

## 2022-01-23 RX ADMIN — ALBUTEROL SULFATE 2 PUFF: 108 INHALANT RESPIRATORY (INHALATION) at 02:01

## 2022-01-23 RX ADMIN — Medication 250 MG: at 09:01

## 2022-01-23 RX ADMIN — ENOXAPARIN SODIUM 30 MG: 30 INJECTION, SOLUTION INTRAVENOUS; SUBCUTANEOUS at 04:01

## 2022-01-23 RX ADMIN — CYANOCOBALAMIN TAB 250 MCG 250 MCG: 250 TAB at 09:01

## 2022-01-23 RX ADMIN — PANTOPRAZOLE SODIUM 40 MG: 40 TABLET, DELAYED RELEASE ORAL at 09:01

## 2022-01-23 RX ADMIN — AMLODIPINE BESYLATE 10 MG: 10 TABLET ORAL at 08:01

## 2022-01-23 RX ADMIN — ALBUTEROL SULFATE 2 PUFF: 108 INHALANT RESPIRATORY (INHALATION) at 07:01

## 2022-01-23 NOTE — PROGRESS NOTES
Hospital Medicine  Progress Note  [facility]      Patient Name: Devika Ashby  MRN:  [id number]  Hospital Medicine Team: [facility] Perico Mc MD  Date of Admission:  1/21/2022     Length of Stay:  LOS: 0 days       Principal Problem:  UTI (urinary tract infection)      Subjective:  Patient denies acute complaints, denies nausea, vomiting, fevers, chills, night sweats. Patient has been too tweak to work with PTOT.      HPI: Devika Ashby is a 66 y.o. female with PMHx significant for HTN, HLD, DMII, CVA, severe malnutrition, and medical noncompliance who presents from [facility] for evaluation for decreased appetite. The patient reports she has had decreased appetite for the last several months which has worsened in the last few weeks. She notes continued weight loss since discharge from the hospital last month. Upon review of records she was admitted to [facility] [place] for severe malnutrition and debility and discharged to [facility]. She endorses intermittent nausea but denies any vomiting or diarrhea. She reports fatigue, generalized weakness, and intermittent abdominal cramping. The patient also notes a productive cough beginning last week and tested positive for COVID-19 on Sunday. She denies any shortness of breath, fever, chills, chest pain, dysuria, or urinary frequency.     In the ED, VSSAF. CBC with WBC 3.38k. CMP with hypokalemia, low protein, and hypoalbuminemia. Lactate WNL. UA with 15 RBCs, 25 WBCs, occasional WBC clumps, and few bacteria. CXR concerning for possible new infiltrate right lower lung zone. The patient also tested positive again this evening for COVID-19. The patient received zofran, 1L LR, rocephin, and 50meq KCL replacement.    Inpatient Medications:    Current Facility-Administered Medications:     acetaminophen tablet 650 mg, 650 mg, Oral, Q6H PRN, Nicole Armijo NP    albuterol inhaler 2 puff, 2 puff, Inhalation, Q6H PRN, Nicole  RICCARDO Armijo NP    albuterol inhaler 2 puff, 2 puff, Inhalation, Q6H, 2 puff at 01/23/22 0742 **AND** MDI Q6H, , , Q6H, Nicole Armijo NP    amLODIPine tablet 10 mg, 10 mg, Oral, QHS, Nicole Armijo NP, 10 mg at 01/22/22 2034    ascorbic acid (vitamin C) tablet 250 mg, 250 mg, Oral, Daily, Nicole Armijo NP, 250 mg at 01/23/22 0903    aspirin tablet 325 mg, 325 mg, Oral, Daily, Nicole Armijo NP, 325 mg at 01/23/22 0903    benzonatate capsule 100 mg, 100 mg, Oral, TID PRN, Nicole Armijo NP    cefTRIAXone (ROCEPHIN) 1 g/50 mL D5W IVPB, 1 g, Intravenous, Q24H, Nicole Armijo NP, Stopped at 01/23/22 1200    cyanocobalamin tablet 250 mcg, 250 mcg, Oral, Daily, Nicole Armijo NP, 250 mcg at 01/23/22 0902    dextrose 50% injection 12.5 g, 12.5 g, Intravenous, PRN, Nicole Armijo NP    dextrose 50% injection 25 g, 25 g, Intravenous, PRN, Nicole Armijo NP    diphenhydrAMINE capsule 50 mg, 50 mg, Oral, Q6H PRN, Nicole Armijo NP    enoxaparin injection 30 mg, 30 mg, Subcutaneous, Q24H, Nicole Armijo NP, 30 mg at 01/22/22 1622    fluticasone propionate 50 mcg/actuation nasal spray 100 mcg, 2 spray, Each Nostril, Daily, Nicole Armijo NP    glucagon (human recombinant) injection 1 mg, 1 mg, Intramuscular, PRN, Nicole Armijo NP    glucose chewable tablet 16 g, 16 g, Oral, PRN, Nicole Armijo NP    glucose chewable tablet 24 g, 24 g, Oral, PRN, Nicole Armijo NP    insulin aspart U-100 pen 0-5 Units, 0-5 Units, Subcutaneous, QID (AC + HS) PRN, Nicole Armijo NP, 2 Units at 01/22/22 1043    melatonin tablet 6 mg, 6 mg, Oral, Nightly PRN, Nicole Armijo NP    metoclopramide HCl injection 10 mg, 10 mg, Intravenous, Q6H PRN, Nicole Armijo, NP, 10 mg at 01/22/22 0933    pantoprazole EC tablet 40 mg, 40 mg, Oral, Daily, Nicole Armijo NP, 40 mg at 01/23/22 0902    potassium bicarbonate disintegrating tablet 35 mEq, 35 mEq, Oral, PRN,  "Nicole Armijo NP    potassium bicarbonate disintegrating tablet 50 mEq, 50 mEq, Oral, PRN, Nicole Armijo NP    potassium bicarbonate disintegrating tablet 60 mEq, 60 mEq, Oral, PRN, Nicole Armijo NP    pyridoxine (vitamin B6) tablet 50 mg, 50 mg, Oral, Daily, Nicole Armijo NP, 50 mg at 01/23/22 0903    sodium chloride 0.65 % nasal spray 1 spray, 1 spray, Each Nostril, PRN, Nicole Armijo NP    sodium chloride 0.9% flush 10 mL, 10 mL, Intravenous, PRN, Nicole Armijo NP    vitamin E capsule 400 Units, 400 Units, Oral, Daily, Nicole Armijo NP, 400 Units at 01/23/22 0933      Physical Exam:      Intake/Output Summary (Last 24 hours) at 1/23/2022 1310  Last data filed at 1/23/2022 0800  Gross per 24 hour   Intake 800 ml   Output 400 ml   Net 400 ml     Wt Readings from Last 3 Encounters:   01/22/22 34.7 kg (76 lb 6.4 oz)   12/12/21 39.2 kg (86 lb 6.7 oz)   06/02/20 55 kg (121 lb 4.1 oz)       BP (!) 145/78 (BP Location: Right arm, Patient Position: Lying)   Pulse 97   Temp 98.2 °F (36.8 °C) (Oral)   Resp 18   Ht 5' 2" (1.575 m)   Wt 34.7 kg (76 lb 6.4 oz)   LMP  (LMP Unknown)   SpO2 95%   Breastfeeding No   BMI 13.97 kg/m²     GEN: NAD, conversant  Resp: coarse bilateral breath sounds, no wheezes or rales, normal work of breathing   CV: RRR, no m/r/g, no edema  GI: soft, NTND  Skin: no rash  Neuro: AAOx3, CN grossly intact, no focal neurologic deficits    Laboratory:  Lab Results   Component Value Date    [id number] Positive (A) 01/21/2022       Recent Labs   Lab 01/21/22  1303 01/22/22  0423   WBC 3.38* 4.29   LYMPH 35.8  1.2 20.0  0.9*   HGB 13.0 13.5   HCT 38.0 42.2    277     Recent Labs   Lab 01/21/22  1303 01/22/22  0423   * 135*   K 2.8* 3.6   CL 97 98   CO2 25 25   BUN 12 9   CREATININE 0.6 0.5   * 187*   CALCIUM 8.1* 8.6*   MG 1.7 1.7   LIPASE 9  --      Recent Labs   Lab 01/21/22  1303 01/21/22 2113 01/22/22  0423   ALKPHOS 61  --  65 "   ALT 22  --  21   AST 22  --  23   ALBUMIN 2.7*  --  2.8*   PROT 5.6*  --  5.8*   BILITOT 0.6  --  0.5   INR  --  0.9  --         Recent Labs     01/21/22 2112 01/22/22  0843   DDIMER  --  0.73*   FERRITIN 799*  --      --    *  --    TROPONINI 0.020  --    CPK 24  --        All labs within the last 24 hours were reviewed.     Microbiology:  Microbiology Results (last 7 days)     Procedure Component Value Units Date/Time    Culture, Respiratory with Gram Stain [[id number]] Collected: 01/22/22 0755    Order Status: Completed Specimen: Respiratory from Sputum Updated: 01/23/22 0810     Respiratory Culture Normal respiratory pooja     Gram Stain (Respiratory) <10 epithelial cells per low power field.     Gram Stain (Respiratory) Rare WBC's     Gram Stain (Respiratory) No organisms seen    Urine culture [[id number]] Collected: 01/21/22 1645    Order Status: Completed Specimen: Urine Updated: 01/23/22 0502     Urine Culture, Routine Multiple organisms isolated. None in predominance.  Repeat if      clinically necessary.    Narrative:      Specimen Source->Urine    Blood Culture #1 **CANNOT BE ORDERED STAT** [[id number]] Collected: 01/21/22 1805    Order Status: Completed Specimen: Blood from Peripheral, Antecubital, Left Updated: 01/22/22 2012     Blood Culture, Routine No growth to date      No Growth to date    Blood Culture #2 **CANNOT BE ORDERED STAT** [[id number]] Collected: 01/21/22 1812    Order Status: Completed Specimen: Blood from Peripheral, Hand, Left Updated: 01/22/22 2012     Blood Culture, Routine No growth to date      No Growth to date            Imaging  ECG Results          EKG 12-lead (In process)  Result time 01/21/22 15:35:51    In process by Interface, Lab In [facility] (01/21/22 15:35:51)                 Narrative:    Test Reason : E87.6,    Vent. Rate : 077 BPM     Atrial Rate : 077 BPM     P-R Int : 170 ms          QRS Dur : 106 ms      QT Int : 318 ms       P-R-T Axes : 068 003 -65  degrees     QTc Int : 359 ms    Normal sinus rhythm  Cannot rule out Anterior infarct ,age undetermined  ST and T wave abnormality, consider inferior ischemia  Abnormal ECG  When compared with ECG of 13-DEC-2021 17:14,  Inverted T waves have replaced nonspecific T wave abnormality in Inferior  leads  QT has shortened    Referred By: [email or web address]   SELF           Confirmed By:                               No results found for this or any previous visit.      X-Ray Chest AP Portable  Narrative: EXAMINATION:  XR CHEST AP PORTABLE    CLINICAL HISTORY:  Weakness    TECHNIQUE:  Single frontal view of the chest was performed.    COMPARISON:  04/15/2016    FINDINGS:  Again seen, loop recorder projected over the left medial chest.    The heart size is normal.  There is mild calcification of the aortic knob consistent with atherosclerotic stigmata.    There are new patchy airspace opacities at the right base.  Although portions are linear and may represent subsegmental atelectasis, some have a more ill-defined appearance and are therefore concerning for new infiltrate.  Impression: Possible new infiltrate right lower lung zone.    Electronically signed by: Clarissa Burrell  Date:    01/21/2022  Time:    13:53      All imaging within the last 24 hours was reviewed.     Assessment and Plan:    Active Hospital Problems    Diagnosis  POA    *UTI (urinary tract infection) [N39.0]  Yes    Pneumonia of right lower lobe due to infectious organism [J18.9]  Yes    COVID-19 virus infection [U07.1]  Yes    Severe malnutrition [E43]  Yes     Nutrition Problem:  Severe Protein-Calorie Malnutrition  Malnutrition in the context of Chronic Illness/Injury    Related to (etiology):  Inability to consume sufficient energy  Food- and nutrition-related knowledge deficit    Signs and Symptoms (as evidenced by):  Energy Intake: <75% of estimated energy requirement for 2 years  Body Fat Depletion: moderate and severe depletion of orbitals, triceps and  thoracic and lumbar region   Muscle Mass Depletion: moderate and severe depletion of temples, clavicle region, scapular region, interosseous muscle and lower extremities     Interventions(treatment strategy):  Collaboration with other providers  Commercial beverage  Nutrition education    Nutrition Diagnosis Status:  New        Hypokalemia [E87.6]  Yes    Weakness [R53.1]  Yes    Noncompliance with medication regimen [Z91.14]  Not Applicable    Hypertension, essential [I10]  Yes    Hyperlipidemia [E78.5]  Yes    Type 2 diabetes mellitus with complication, without long-term current use of insulin [E11.8]  Yes      Resolved Hospital Problems   No resolved problems to display.         UTI (urinary tract infection)  -UA reviewed, follow cx.  -Started on rocephin in the ED, continue for now.     COVID-19 virus infection  Patient is not hypoxic or tachypneic. She complains of a cough, decreased appetite, nausea, and fatigue. Patient was initially COVID + on 1/16 and was positive again today. Does not meet criteria for decadron or remdesivir.      - COVID-19 testing   - Infection Control notified      - Isolation:   - Airborne, Contact and Droplet Precautions  - Cohort patients into COVID units  - N95 mask, wear eye protection  - 20 second hand hygiene              - Limit visitors per hospital policy              - Consolidating lab draws, nursing care, provider visits, and interventions     - Diagnostics: (leukopenia, hyponatremia, hyperferritinemia, elevated troponin, elevated d-dimer, age, and comorbidities are significant predictors of poor clinical outcome)  CBC, CMP, Procalcitonin, Ferritin, CRP, LDH, BNP, Troponin, Portable CXR and UA and culture     - Management:  Supplemental O2 to maintain SpO2 >92%  Telemetry  Continuous/intermittent Pulse Ox  Albuterol treatment PRN     Pneumonia of right lower lobe due to infectious organism  Patient c/o productive cough x1 week. Tested positive for COVID on 1/16 and  again today. Patient is not hypoxic or tachypneic.  -1/4 SIRS  -CXR concerning for possible new infiltrate right lower lung zone.  -Started on rocephin in the ED, continue for now. Has PCN allergy, doxycycline allergy, and history of myasthenia gravis so was not started on azithromycin.  -Sputum cx  -Albuterol inhaler PRN     Severe malnutrition  Body mass index (BMI) less than 19  Nutrition consulted. Body mass index is 13.97 kg/m².. Encourage maximal PO intake. Diet supplementation ordered per nutrition approval. Will encourage PO and monitor closely for weight changes.     Hypokalemia, resolved  -replaced   -continue to monitor.     Weakness  -Likely due to infection and severe malnutrition.  -PT/OT  - high risk of fall / injury utilize all safety measures and fall precautions      Noncompliance with medication regimen  -Long history of refusal of most medications including cholesterol medications and diabetic medications.  -Continue to educate patient of importance of medication compliance.     Hyperlipidemia  - refuses to take statin as she states it gives her body aches. I offered to change her medication as that is a common side effect, and she refused     Hypertension, essential  -Currently normotensive.  -Continue norvasc 10mg nightly     Type 2 diabetes mellitus with complication, without long-term current use of insulin  - has refused metformin and insulin in the past  - glucose 166 on admit  - Last Hgb a1c 9.3  - started on LDSSI, though patient will likely refuse it  - Accuchecks ACHS  - hypoglycemic protocol  - diabetic diet           VTE High Risk Prophylaxis:   VTE Risk Mitigation (From admission, onward)         Ordered     enoxaparin injection 30 mg  Every 24 hours (non-standard times)         01/21/22 1920                  Discharge Planning   PRIYANKA: 1/24/2022     Code Status: Full Code   Is the patient medically ready for discharge?: No              Awaiting PTOT recs    Perico Mc  MD  [facility]

## 2022-01-23 NOTE — PLAN OF CARE
Problem: Physical Therapy Goal  Goal: Physical Therapy Goal  Description: Goals to be met by: 2022     Patient will increase functional independence with mobility by performin. Supine to sit with Stand-by Assistance  2. Sit to stand transfer with Minimal Assistance  3. Bed to chair transfer with Minimal Assistance using Rolling Walker  4. Gait x15 ft with minimum assistance with use of a rolling walker       Outcome: Ongoing, Progressing     Pt evaluated and appropriate goals established.

## 2022-01-23 NOTE — PLAN OF CARE
Problem: UTI (Urinary Tract Infection)  Goal: Improved Infection Symptoms  1/23/2022 0529 by Hailee Cantu RN  Outcome: Ongoing, Progressing     Problem: Infection (Pneumonia)  Goal: Resolution of Infection Signs and Symptoms  1/23/2022 0529 by Hailee Cantu RN  Outcome: Ongoing, Progressing     Continues on ABX for tx. Education provided.

## 2022-01-23 NOTE — PLAN OF CARE
Recommendations    1. Continue Diabetic diet     2. Continue Boost Glucose Control TID     3. Encourage PO intake     4. If PO intake remains poor, consider appetite stimulant     5. If TF warranted, rec Glucerna 1.5 advancing as tolerated until goal of 40 mL/hr providing pt with 1440 kcal, 79 g protein, and 729 mL free water    Goals: Pt to meet >/= 75% EEN/EPN by f/u date  Nutrition Goal Status: new  Communication of DAYSI Recs:  (POC)

## 2022-01-23 NOTE — PLAN OF CARE
Problem: Occupational Therapy Goal  Goal: Occupational Therapy Goal  Description: Goals to be met by: 2/6/2022     Patient will increase functional independence with ADLs by performing:    Feeding with Set-up Assistance.  UE Dressing with Set-up Assistance.  LE Dressing with Stand-by Assistance.  Grooming while seated with Set-up Assistance.  Sitting at edge of bed x20 minutes with Supervision and no UE support.  Supine to sit with Supervision.  Stand pivot transfers with Maximum Assistance using AD PRN.  Toilet transfer to bedside commode with Maximum Assistance.  Upper extremity exercise program x10 reps per handout, with assistance as needed.    Outcome: Ongoing, Progressing     Evaluation complete; goals and POC established.

## 2022-01-23 NOTE — PT/OT/SLP EVAL
Physical Therapy Co-Evaluation and Treatment     Patient Name:  Devika Ashby   MRN:  [id number]    *co-treatment with OT  2/2 pt with potential impaired ability to tolerate 2 evaluations 2/2 medical status   Recommendations:     Discharge Recommendations:  nursing facility, skilled   Discharge Equipment Recommendations: none   Barriers to discharge: None    Assessment:     Devika Ashby is a 66 y.o. female admitted with a medical diagnosis of UTI (urinary tract infection).  She presents with the following impairments/functional limitations:  weakness,impaired endurance,impaired self care skills,impaired functional mobilty,gait instability,impaired balance. Pt mobility primarily self-limiting, preoccupied with her weakness, requesting more time to rest. PT/OT provided significant education regarding importance of consistent intervention to progress strength, reinforced negative effects of prolonged bed rest. Pt able to sit EOB unsupported today, unable to progress further 2/2 pt apprehension. Pt presents with potential for functional improvement. Reports goals that involve progressing to OOB mobility with decreased assistance. Upon discharge, pt would benefit from continued skilled therapy intervention at skilled nursing facility to progress toward more independent mobility. At this time, pt would continue to benefit from acute skilled therapy intervention to address deficits and progress toward prior level of function.       Rehab Prognosis: Good; patient would benefit from acute skilled PT services to address these deficits and reach maximum level of function.    Recent Surgery: * No surgery found *      Plan:     During this hospitalization, patient to be seen   to address the identified rehab impairments via gait training,therapeutic activities,therapeutic exercises,neuromuscular re-education and progress toward the following goals:    · Plan of Care Expires:  02/23/22    Subjective     Chief Complaint: Pt  "reporting fear of falling, requesting more time to rest, states "you all push me too much. I need more time"   Patient/Family Comments/goals: to get better and return home   Pain/Comfort:  · Pain Rating 1: 0/10  · Pain Rating Post-Intervention 1: 0/10    Patients cultural, spiritual, Catholic conflicts given the current situation: no    Living Environment:  Pt lives at [facility]; recently transitioned to being a resident after being at [facility].  Prior Level of Function: Patient reports needing assistance with mobility and ADLs. In SNF, pt was walking short distances with a rolling walker and assistance.  Patient uses DME as follows: walker, rolling,wheelchair.   DME owned (not currently used): none.  Roles/Repsonsibilities:   Work: no.   Drive: no.   Managing Medicines/Managing Home: no.   Equipment Used at Home:  walker, rolling,wheelchair     Patient reports they will have assistance from nursing home staff upon discharge.    Objective:     Communicated with RN prior to session.  Patient found HOB elevated with pulse ox (continuous),telemetry  upon PT entry to room.    General Precautions: Standard, fall   Orthopedic Precautions:N/A   Braces: N/A  Respiratory Status: Room air    Exams:  · Cognitive Exam:  Patient is AAOx4, followed all commands, communicates clearly and fluently  · Gross Motor Coordination:  WFL  · RLE ROM: WFL  · RLE Strength: WFL  · LLE ROM: WFL  · LLE Strength: WFL    Functional Mobility:  · Bed Mobility:     · Supine to Sit: minimum assistance  · Sit to Supine: contact guard assistance    Therapeutic Activities and Exercises:   Pt sat EOB for 10 mins with stand by assistance for static sitting balance. Pt reported dizziness, demo'd occasional lateral sway but able to recover without assistance. While pt sitting EOB, extensive discussion held regarding importance of consistent intervention to progress strength, reinforced negative effects of prolonged bed rest.   Pt educated on " role of PT/POC. Pt verbalized understanding.       AM-PAC 6 CLICK MOBILITY  Total Score:11     Patient left HOB elevated with all lines intact, call button in reach and RN notified.    GOALS:   Multidisciplinary Problems     Physical Therapy Goals        Problem: Physical Therapy Goal    Goal Priority Disciplines Outcome Goal Variances Interventions   Physical Therapy Goal     PT, PT/OT Ongoing, Progressing     Description: Goals to be met by: 2022     Patient will increase functional independence with mobility by performin. Supine to sit with Stand-by Assistance  2. Sit to stand transfer with Minimal Assistance  3. Bed to chair transfer with Minimal Assistance using Rolling Walker  4. Gait x15 ft with minimum assistance with use of a rolling walker                        History:     Past Medical History:   Diagnosis Date    CVA (cerebral infarction)     Diabetes mellitus     HLD (hyperlipidemia)     Hypertension     Myasthenia gravis     Severe malnutrition 2021    Nutrition Problem: Severe Protein-Calorie Malnutrition Malnutrition in the context of Chronic Illness/Injury  Related to (etiology): Inability to consume sufficient energy Food- and nutrition-related knowledge deficit  Signs and Symptoms (as evidenced by): Energy Intake: <75% of estimated energy requirement for 2 years Body Fat Depletion: moderate and severe depletion of orbitals, triceps and thor       Past Surgical History:   Procedure Laterality Date    bladder lift      BONE MARROW BIOPSY      CHOLECYSTECTOMY      HYSTERECTOMY      PITUITARY SURGERY         Time Tracking:     PT Received On: 22  PT Start Time: 1103     PT Stop Time: 1123  PT Total Time (min): 20 min     Billable Minutes: Evaluation 10 mins  and Therapeutic Activity 10 mins       2022

## 2022-01-24 LAB
ALBUMIN SERPL BCP-MCNC: 2.7 G/DL (ref 3.5–5.2)
ALP SERPL-CCNC: 58 U/L (ref 55–135)
ALT SERPL W/O P-5'-P-CCNC: 12 U/L (ref 10–44)
ANION GAP SERPL CALC-SCNC: 12 MMOL/L (ref 8–16)
ANISOCYTOSIS BLD QL SMEAR: SLIGHT
AST SERPL-CCNC: 15 U/L (ref 10–40)
BACTERIA SPEC AEROBE CULT: NORMAL
BACTERIA SPEC AEROBE CULT: NORMAL
BASOPHILS # BLD AUTO: 0.02 K/UL (ref 0–0.2)
BASOPHILS NFR BLD: 0.5 % (ref 0–1.9)
BILIRUB SERPL-MCNC: 0.4 MG/DL (ref 0.1–1)
BUN SERPL-MCNC: 11 MG/DL (ref 8–23)
CALCIUM SERPL-MCNC: 8.4 MG/DL (ref 8.7–10.5)
CHLORIDE SERPL-SCNC: 100 MMOL/L (ref 95–110)
CO2 SERPL-SCNC: 27 MMOL/L (ref 23–29)
CREAT SERPL-MCNC: 0.6 MG/DL (ref 0.5–1.4)
D DIMER PPP IA.FEU-MCNC: 0.8 MG/L FEU
DIFFERENTIAL METHOD: ABNORMAL
EOSINOPHIL # BLD AUTO: 0.1 K/UL (ref 0–0.5)
EOSINOPHIL NFR BLD: 3.1 % (ref 0–8)
ERYTHROCYTE [DISTWIDTH] IN BLOOD BY AUTOMATED COUNT: 13.4 % (ref 11.5–14.5)
EST. GFR  (AFRICAN AMERICAN): >60 ML/MIN/1.73 M^2
EST. GFR  (NON AFRICAN AMERICAN): >60 ML/MIN/1.73 M^2
GLUCOSE SERPL-MCNC: 181 MG/DL (ref 70–110)
GRAM STN SPEC: NORMAL
HCT VFR BLD AUTO: 34.5 % (ref 37–48.5)
HGB BLD-MCNC: 11.6 G/DL (ref 12–16)
HYPOCHROMIA BLD QL SMEAR: ABNORMAL
IMM GRANULOCYTES # BLD AUTO: 0 K/UL (ref 0–0.04)
IMM GRANULOCYTES NFR BLD AUTO: 0 % (ref 0–0.5)
LYMPHOCYTES # BLD AUTO: 1.2 K/UL (ref 1–4.8)
LYMPHOCYTES NFR BLD: 29.2 % (ref 18–48)
MAGNESIUM SERPL-MCNC: 1.7 MG/DL (ref 1.6–2.6)
MCH RBC QN AUTO: 28.6 PG (ref 27–31)
MCHC RBC AUTO-ENTMCNC: 33.6 G/DL (ref 32–36)
MCV RBC AUTO: 85 FL (ref 82–98)
MONOCYTES # BLD AUTO: 0.3 K/UL (ref 0.3–1)
MONOCYTES NFR BLD: 6.1 % (ref 4–15)
NEUTROPHILS # BLD AUTO: 2.6 K/UL (ref 1.8–7.7)
NEUTROPHILS NFR BLD: 61.1 % (ref 38–73)
NRBC BLD-RTO: 0 /100 WBC
OVALOCYTES BLD QL SMEAR: ABNORMAL
PLATELET # BLD AUTO: 326 K/UL (ref 150–450)
PMV BLD AUTO: 10.8 FL (ref 9.2–12.9)
POCT GLUCOSE: 163 MG/DL (ref 70–110)
POCT GLUCOSE: 176 MG/DL (ref 70–110)
POCT GLUCOSE: 181 MG/DL (ref 70–110)
POIKILOCYTOSIS BLD QL SMEAR: SLIGHT
POLYCHROMASIA BLD QL SMEAR: ABNORMAL
POTASSIUM SERPL-SCNC: 3.2 MMOL/L (ref 3.5–5.1)
PROT SERPL-MCNC: 5.5 G/DL (ref 6–8.4)
RBC # BLD AUTO: 4.06 M/UL (ref 4–5.4)
SODIUM SERPL-SCNC: 139 MMOL/L (ref 136–145)
WBC # BLD AUTO: 4.24 K/UL (ref 3.9–12.7)

## 2022-01-24 PROCEDURE — 27000207 HC ISOLATION

## 2022-01-24 PROCEDURE — 36415 COLL VENOUS BLD VENIPUNCTURE: CPT | Performed by: NURSE PRACTITIONER

## 2022-01-24 PROCEDURE — 63600175 PHARM REV CODE 636 W HCPCS: Performed by: NURSE PRACTITIONER

## 2022-01-24 PROCEDURE — 80053 COMPREHEN METABOLIC PANEL: CPT | Performed by: NURSE PRACTITIONER

## 2022-01-24 PROCEDURE — 99232 PR SUBSEQUENT HOSPITAL CARE,LEVL II: ICD-10-PCS | Mod: ,,, | Performed by: PHYSICIAN ASSISTANT

## 2022-01-24 PROCEDURE — 85025 COMPLETE CBC W/AUTO DIFF WBC: CPT | Performed by: NURSE PRACTITIONER

## 2022-01-24 PROCEDURE — 99232 SBSQ HOSP IP/OBS MODERATE 35: CPT | Mod: ,,, | Performed by: PHYSICIAN ASSISTANT

## 2022-01-24 PROCEDURE — 25000003 PHARM REV CODE 250: Performed by: PHYSICIAN ASSISTANT

## 2022-01-24 PROCEDURE — 85379 FIBRIN DEGRADATION QUANT: CPT | Performed by: NURSE PRACTITIONER

## 2022-01-24 PROCEDURE — 25000003 PHARM REV CODE 250: Performed by: NURSE PRACTITIONER

## 2022-01-24 PROCEDURE — 83735 ASSAY OF MAGNESIUM: CPT | Performed by: NURSE PRACTITIONER

## 2022-01-24 PROCEDURE — 11000001 HC ACUTE MED/SURG PRIVATE ROOM

## 2022-01-24 RX ADMIN — POTASSIUM BICARBONATE 50 MEQ: 978 TABLET, EFFERVESCENT ORAL at 09:01

## 2022-01-24 RX ADMIN — DIPHENHYDRAMINE HYDROCHLORIDE 50 MG: 50 CAPSULE ORAL at 09:01

## 2022-01-24 RX ADMIN — Medication 400 UNITS: at 11:01

## 2022-01-24 RX ADMIN — PYRIDOXINE HCL TAB 50 MG 50 MG: 50 TAB at 09:01

## 2022-01-24 RX ADMIN — CYANOCOBALAMIN TAB 250 MCG 250 MCG: 250 TAB at 09:01

## 2022-01-24 RX ADMIN — Medication 250 MG: at 09:01

## 2022-01-24 RX ADMIN — ASPIRIN 325 MG ORAL TABLET 325 MG: 325 PILL ORAL at 09:01

## 2022-01-24 RX ADMIN — AMLODIPINE BESYLATE 10 MG: 10 TABLET ORAL at 08:01

## 2022-01-24 RX ADMIN — PANTOPRAZOLE SODIUM 40 MG: 40 TABLET, DELAYED RELEASE ORAL at 09:01

## 2022-01-24 RX ADMIN — ENOXAPARIN SODIUM 30 MG: 30 INJECTION, SOLUTION INTRAVENOUS; SUBCUTANEOUS at 04:01

## 2022-01-24 RX ADMIN — CEFTRIAXONE 1 G: 1 INJECTION, POWDER, FOR SOLUTION INTRAMUSCULAR; INTRAVENOUS at 11:01

## 2022-01-24 NOTE — PLAN OF CARE
[facility] - Telemetry Stepdown ([facility])  Discharge Assessment    Primary Care Provider: Jeff Gaytan MD     Discharge Assessment (most recent)     BRIEF DISCHARGE ASSESSMENT - 01/24/22 8904        Discharge Planning    Assessment Type Discharge Planning Brief Assessment     Support Systems Family members     Equipment Currently Used at Home walker, rolling;wheelchair     Current Living Arrangements extended care facility     Patient/Family Anticipates Transition to long-term care facility     Patient/Family Anticipated Services at Transition none     DME Needed Upon Discharge  none     Discharge Plan A Return to nursing home     Discharge Plan B Return to Nursing Home                 Pt is a 66 y.o. female admitted with UTI and tested positive for Covid. She has a PMH of  HTN, DM2 and CVA. She is a resident of [facility]. She requires assisance with IADls and ADLs. She will return to [facility]. [facility] Discharge Packet given to patient and/or family with understanding verbalized.   name and number and estimated discharge date written on white board in patient's room with request to call for any questions or concerns.  Will continue to follow for needs.  Tripp Reid RN,BSN

## 2022-01-24 NOTE — HOSPITAL COURSE
Devika Ashby is a 66 y.o. female admitted to hospital medicine for decreased PO intake, found to have a UTI and Covid 19. Started on CTX, transitioned to cefdinir. Covid risk score 4, patient refused remdesivir. Oxygen saturation >94% during stay. Patient is stable for discharge back to [facility] with continued isolation for a total of 10 days.

## 2022-01-24 NOTE — PLAN OF CARE
Return to Nursing Home referral sent to [facility] via [facility].      01/24/22 1431   Post-Acute Status   Post-Acute Authorization Placement   Post-Acute Placement Status Referrals Sent   Discharge Plan   Discharge Plan A Return to nursing home   Discharge Plan B Return to Nursing Home     Tripp Reid RN,BSN

## 2022-01-24 NOTE — PLAN OF CARE
POC progressing. IV ceftriaxone, PT/OT recc SNF upon discharge, speech evaluated pt today and added boost but hospital is out. PO intake encouraged and improving. VSS, RA, NAD. Daughter at bedside, agreeable with plan.

## 2022-01-24 NOTE — SUBJECTIVE & OBJECTIVE
Interval History: Patient seen and examined today. VSS. Patient complains of nausea. QTc at >470. Will give benadryl prn. Patient with covid risk score of 4, refusing remdesivir treatment.     Review of Systems   Constitutional: Positive for appetite change (decreased), fatigue and unexpected weight change (weight loss). Negative for activity change, chills, diaphoresis and fever.   HENT: Negative for congestion, postnasal drip, rhinorrhea and sore throat.    Eyes: Negative for photophobia and visual disturbance.   Respiratory: Positive for cough. Negative for chest tightness, shortness of breath and wheezing.    Cardiovascular: Negative for chest pain, palpitations and leg swelling.   Gastrointestinal: Positive for nausea. Negative for abdominal distention, abdominal pain, diarrhea and vomiting.   Genitourinary: Negative for dysuria, flank pain, frequency and hematuria.   Musculoskeletal: Negative for arthralgias, gait problem, myalgias and neck pain.   Skin: Negative for color change, pallor, rash and wound.   Neurological: Positive for weakness (generalized). Negative for dizziness, syncope, light-headedness and headaches.   Psychiatric/Behavioral: Negative for agitation, confusion and hallucinations. The patient is not nervous/anxious.      Objective:     Vital Signs (Most Recent):  Temp: 98.1 °F (36.7 °C) (01/24/22 1243)  Pulse: 82 (01/24/22 1243)  Resp: 17 (01/24/22 1243)  BP: 125/63 (01/24/22 1243)  SpO2: (!) 94 % (01/24/22 1243) Vital Signs (24h Range):  Temp:  [97.5 °F (36.4 °C)-98.4 °F (36.9 °C)] 98.1 °F (36.7 °C)  Pulse:  [70-91] 82  Resp:  [17-20] 17  SpO2:  [94 %-96 %] 94 %  BP: (122-135)/(63-76) 125/63     Weight: 34.7 kg (76 lb 6.4 oz)  Body mass index is 13.97 kg/m².    Intake/Output Summary (Last 24 hours) at 1/24/2022 1325  Last data filed at 1/24/2022 0600  Gross per 24 hour   Intake 540 ml   Output 325 ml   Net 215 ml      Physical Exam  Vitals and nursing note reviewed.   Constitutional:        General: She is not in acute distress.     Appearance: She is cachectic. She is not toxic-appearing or diaphoretic.   HENT:      Head: Normocephalic and atraumatic.      Nose: Nose normal.      Mouth/Throat:      Mouth: Mucous membranes are dry.      Dentition: Abnormal dentition (edentulous).   Eyes:      Pupils: Pupils are equal, round, and reactive to light.   Cardiovascular:      Rate and Rhythm: Normal rate and regular rhythm.      Heart sounds: No murmur heard.      Pulmonary:      Effort: No respiratory distress.      Breath sounds: No wheezing, rhonchi or rales.   Abdominal:      General: Bowel sounds are normal. There is no distension.      Tenderness: There is no abdominal tenderness. There is no guarding.   Genitourinary:     Comments: deferred  Musculoskeletal:         General: No swelling, tenderness or deformity. Normal range of motion.      Cervical back: Normal range of motion. No tenderness.   Skin:     General: Skin is warm and dry.      Capillary Refill: Capillary refill takes less than 2 seconds.   Neurological:      General: No focal deficit present.      Mental Status: She is alert and oriented to person, place, and time.      Motor: Weakness (generalized) present.   Psychiatric:         Mood and Affect: Mood normal.         Behavior: Behavior normal. Behavior is cooperative.         Significant Labs:   All pertinent labs within the past 24 hours have been reviewed.  BMP:   Recent Labs   Lab 01/24/22  0338   *      K 3.2*      CO2 27   BUN 11   CREATININE 0.6   CALCIUM 8.4*   MG 1.7     CBC:   Recent Labs   Lab 01/24/22 0338   WBC 4.24   HGB 11.6*   HCT 34.5*        CMP:   Recent Labs   Lab 01/24/22 0338      K 3.2*      CO2 27   *   BUN 11   CREATININE 0.6   CALCIUM 8.4*   PROT 5.5*   ALBUMIN 2.7*   BILITOT 0.4   ALKPHOS 58   AST 15   ALT 12   ANIONGAP 12   EGFRNONAA >60.0     Coagulation: No results for input(s): PT, INR, APTT in the last 48  hours.    Significant Imaging: I have reviewed all pertinent imaging results/findings within the past 24 hours.

## 2022-01-24 NOTE — NURSING
Report received per Terra. No apparent distress noted to patient.  Bed low, side rails up x2, call light within reach. Monitoring.

## 2022-01-24 NOTE — PLAN OF CARE
Problem: Infection (Pneumonia)  Goal: Resolution of Infection Signs and Symptoms  Outcome: Ongoing, Progressing     Problem: Respiratory Compromise (Pneumonia)  Goal: Effective Oxygenation and Ventilation  Outcome: Ongoing, Progressing     Problem: UTI (Urinary Tract Infection)  Goal: Improved Infection Symptoms  Outcome: Ongoing, Progressing     Currently on ABX to treat UTI and Pneumonia. Tolerating well.

## 2022-01-24 NOTE — PROGRESS NOTES
[facility] [facility]  Progress Note    Patient Name: Devika Ashby  MRN: [id number]  Patient Class: IP- Inpatient   Admission Date: 1/21/2022  Length of Stay: 1 days  Attending Physician: Peterson Magallanes MD  Primary Care Provider: Jeff Gaytan MD        Subjective:     Principal Problem:UTI (urinary tract infection)        HPI:  Devika Ashby is a 66 y.o. female with PMHx significant for HTN, HLD, DMII, CVA, severe malnutrition, and medical noncompliance who presents from [facility] for evaluation for decreased appetite. The patient reports she has had decreased appetite for the last several months which has worsened in the last few weeks. She notes continued weight loss since discharge from the hospital last month. Upon review of records she was admitted to [facility] in December for severe malnutrition and debility and discharged to [facility]. She endorses intermittent nausea but denies any vomiting or diarrhea. She reports fatigue, generalized weakness, and intermittent abdominal cramping. The patient also notes a productive cough beginning last week and tested positive for COVID-19 on Sunday. She denies any shortness of breath, fever, chills, chest pain, dysuria, or urinary frequency.    In the ED, VSSAF. CBC with WBC 3.38k. CMP with hypokalemia, low protein, and hypoalbuminemia. Lactate WNL. UA with 15 RBCs, 25 WBCs, occasional WBC clumps, and few bacteria. CXR concerning for possible new infiltrate right lower lung zone. The patient also tested positive again this evening for COVID-19. The patient received zofran, 1L LR, rocephin, and 50meq KCL replacement.      Overview/Hospital Course:  Devika Ashby is a 66 y.o. female admitted to hospital medicine for decreased PO intake, found to have a UTI and Covid 19. Started on CTX. Place on isolation precautions. Covid risk score 4, patient refused remdesivir. Plan for return to [facility] when stable.        Interval History: Patient seen and examined today. VSS. Patient complains of nausea. QTc at >470. Will give benadryl prn. Patient with covid risk score of 4, refusing remdesivir treatment.     Review of Systems   Constitutional: Positive for appetite change (decreased), fatigue and unexpected weight change (weight loss). Negative for activity change, chills, diaphoresis and fever.   HENT: Negative for congestion, postnasal drip, rhinorrhea and sore throat.    Eyes: Negative for photophobia and visual disturbance.   Respiratory: Positive for cough. Negative for chest tightness, shortness of breath and wheezing.    Cardiovascular: Negative for chest pain, palpitations and leg swelling.   Gastrointestinal: Positive for nausea. Negative for abdominal distention, abdominal pain, diarrhea and vomiting.   Genitourinary: Negative for dysuria, flank pain, frequency and hematuria.   Musculoskeletal: Negative for arthralgias, gait problem, myalgias and neck pain.   Skin: Negative for color change, pallor, rash and wound.   Neurological: Positive for weakness (generalized). Negative for dizziness, syncope, light-headedness and headaches.   Psychiatric/Behavioral: Negative for agitation, confusion and hallucinations. The patient is not nervous/anxious.      Objective:     Vital Signs (Most Recent):  Temp: 98.1 °F (36.7 °C) (01/24/22 1243)  Pulse: 82 (01/24/22 1243)  Resp: 17 (01/24/22 1243)  BP: 125/63 (01/24/22 1243)  SpO2: (!) 94 % (01/24/22 1243) Vital Signs (24h Range):  Temp:  [97.5 °F (36.4 °C)-98.4 °F (36.9 °C)] 98.1 °F (36.7 °C)  Pulse:  [70-91] 82  Resp:  [17-20] 17  SpO2:  [94 %-96 %] 94 %  BP: (122-135)/(63-76) 125/63     Weight: 34.7 kg (76 lb 6.4 oz)  Body mass index is 13.97 kg/m².    Intake/Output Summary (Last 24 hours) at 1/24/2022 1325  Last data filed at 1/24/2022 0600  Gross per 24 hour   Intake 540 ml   Output 325 ml   Net 215 ml      Physical Exam  Vitals and nursing note reviewed.   Constitutional:        General: She is not in acute distress.     Appearance: She is cachectic. She is not toxic-appearing or diaphoretic.   HENT:      Head: Normocephalic and atraumatic.      Nose: Nose normal.      Mouth/Throat:      Mouth: Mucous membranes are dry.      Dentition: Abnormal dentition (edentulous).   Eyes:      Pupils: Pupils are equal, round, and reactive to light.   Cardiovascular:      Rate and Rhythm: Normal rate and regular rhythm.      Heart sounds: No murmur heard.      Pulmonary:      Effort: No respiratory distress.      Breath sounds: No wheezing, rhonchi or rales.   Abdominal:      General: Bowel sounds are normal. There is no distension.      Tenderness: There is no abdominal tenderness. There is no guarding.   Genitourinary:     Comments: deferred  Musculoskeletal:         General: No swelling, tenderness or deformity. Normal range of motion.      Cervical back: Normal range of motion. No tenderness.   Skin:     General: Skin is warm and dry.      Capillary Refill: Capillary refill takes less than 2 seconds.   Neurological:      General: No focal deficit present.      Mental Status: She is alert and oriented to person, place, and time.      Motor: Weakness (generalized) present.   Psychiatric:         Mood and Affect: Mood normal.         Behavior: Behavior normal. Behavior is cooperative.         Significant Labs:   All pertinent labs within the past 24 hours have been reviewed.  BMP:   Recent Labs   Lab 01/24/22  0338   *      K 3.2*      CO2 27   BUN 11   CREATININE 0.6   CALCIUM 8.4*   MG 1.7     CBC:   Recent Labs   Lab 01/24/22 0338   WBC 4.24   HGB 11.6*   HCT 34.5*        CMP:   Recent Labs   Lab 01/24/22 0338      K 3.2*      CO2 27   *   BUN 11   CREATININE 0.6   CALCIUM 8.4*   PROT 5.5*   ALBUMIN 2.7*   BILITOT 0.4   ALKPHOS 58   AST 15   ALT 12   ANIONGAP 12   EGFRNONAA >60.0     Coagulation: No results for input(s): PT, INR, APTT in the last 48  hours.    Significant Imaging: I have reviewed all pertinent imaging results/findings within the past 24 hours.      Assessment/Plan:      * UTI (urinary tract infection)  -UA reviewed, follow cx.  -Started on rocephin in the ED, continue for now.    COVID-19 virus infection  Patient is not hypoxic or tachypneic. She complains of a cough, decreased appetite, nausea, and fatigue. Patient was initially COVID + on 1/16 and was positive again today. Does not meet criteria for decadron or remdesivir.     - COVID-19 testing   - Infection Control notified     - Isolation:   - Airborne, Contact and Droplet Precautions  - Cohort patients into COVID units  - N95 mask, wear eye protection  - 20 second hand hygiene              - Limit visitors per hospital policy              - Consolidating lab draws, nursing care, provider visits, and interventions    - Diagnostics: (leukopenia, hyponatremia, hyperferritinemia, elevated troponin, elevated d-dimer, age, and comorbidities are significant predictors of poor clinical outcome)  CBC, CMP, Procalcitonin, Ferritin, CRP, LDH, BNP, Troponin, Portable CXR and UA and culture    - Management:  Supplemental O2 to maintain SpO2 >92%  Telemetry  Continuous/intermittent Pulse Ox  Albuterol treatment PRN    Pneumonia of right lower lobe due to infectious organism  Patient c/o productive cough x1 week. Tested positive for COVID on 1/16 and again today. Patient is not hypoxic or tachypneic.  -1/4 SIRS  -CXR concerning for possible new infiltrate right lower lung zone.  -Started on rocephin in the ED, continue for now. Has PCN allergy, doxycycline allergy, and history of myasthenia gravis so was not started on azithromycin.  -Sputum cx  -Albuterol inhaler PRN    Severe malnutrition  Body mass index (BMI) less than 19  Nutrition consulted. Body mass index is 13.97 kg/m².. Encourage maximal PO intake. Diet supplementation ordered per nutrition approval. Will encourage PO and monitor closely for  weight changes.    Hypokalemia  Patient has hypokalemia which is currently uncontrolled. Last electrolytes reviewed-   Recent Labs   Lab 01/24/22  0338   K 3.2*   . Will replace potassium and monitor electrolytes closely. Continuous telemetry.    Weakness  -Likely due to infection and severe malnutrition.  -PT/OT  - high risk of fall / injury utilize all safety measures and fall precautions     Noncompliance with medication regimen  -Long history of refusal of most medications including cholesterol medications and diabetic medications.  -Continue to educate patient of importance of medication compliance.    Hyperlipidemia  - refuses to take statin as she states it gives her body aches. I offered to change her medication as that is a common side effect, and she refused    Hypertension, essential  -Currently normotensive.  -Continue norvasc 10mg nightly    Type 2 diabetes mellitus with complication, without long-term current use of insulin  - has refused metformin and insulin in the past  - glucose 166 on admit  - Last Hgb a1c 9.3  - started on LDSSI, though patient will likely refuse it  - Accuchecks ACHS  - hypoglycemic protocol  - diabetic diet      VTE Risk Mitigation (From admission, onward)         Ordered     enoxaparin injection 30 mg  Every 24 hours (non-standard times)         01/21/22 1920                Discharge Planning   PRIYANKA: 1/25/2022     Code Status: Full Code   Is the patient medically ready for discharge?: No    Reason for patient still in hospital (select all that apply): Patient trending condition and Treatment                     Brittney Rader PA-C  Department of Hospital Medicine   [facility] - Telemetry Stepdown ([place]-7)

## 2022-01-24 NOTE — ASSESSMENT & PLAN NOTE
Patient has hypokalemia which is currently uncontrolled. Last electrolytes reviewed-   Recent Labs   Lab 01/24/22  0338   K 3.2*   . Will replace potassium and monitor electrolytes closely. Continuous telemetry.

## 2022-01-25 VITALS
BODY MASS INDEX: 14.06 KG/M2 | SYSTOLIC BLOOD PRESSURE: 109 MMHG | TEMPERATURE: 99 F | RESPIRATION RATE: 16 BRPM | WEIGHT: 76.38 LBS | OXYGEN SATURATION: 95 % | HEART RATE: 94 BPM | HEIGHT: 62 IN | DIASTOLIC BLOOD PRESSURE: 65 MMHG

## 2022-01-25 DIAGNOSIS — U07.1 COVID-19 VIRUS DETECTED: ICD-10-CM

## 2022-01-25 LAB — POCT GLUCOSE: 194 MG/DL (ref 70–110)

## 2022-01-25 PROCEDURE — 63600175 PHARM REV CODE 636 W HCPCS: Performed by: NURSE PRACTITIONER

## 2022-01-25 PROCEDURE — 25000242 PHARM REV CODE 250 ALT 637 W/ HCPCS: Performed by: NURSE PRACTITIONER

## 2022-01-25 PROCEDURE — 25000003 PHARM REV CODE 250: Performed by: NURSE PRACTITIONER

## 2022-01-25 PROCEDURE — 99239 PR HOSPITAL DISCHARGE DAY,>30 MIN: ICD-10-PCS | Mod: ,,, | Performed by: PHYSICIAN ASSISTANT

## 2022-01-25 PROCEDURE — 99239 HOSP IP/OBS DSCHRG MGMT >30: CPT | Mod: ,,, | Performed by: PHYSICIAN ASSISTANT

## 2022-01-25 RX ORDER — POTASSIUM CHLORIDE 20 MEQ/1
20 TABLET, EXTENDED RELEASE ORAL 2 TIMES DAILY
Qty: 10 TABLET | Refills: 0
Start: 2022-01-25 | End: 2022-01-30

## 2022-01-25 RX ORDER — CEFDINIR 300 MG/1
300 CAPSULE ORAL 2 TIMES DAILY
Qty: 10 CAPSULE | Refills: 0
Start: 2022-01-25 | End: 2022-01-30

## 2022-01-25 RX ADMIN — FLUTICASONE PROPIONATE 100 MCG: 50 SPRAY, METERED NASAL at 12:01

## 2022-01-25 RX ADMIN — PANTOPRAZOLE SODIUM 40 MG: 40 TABLET, DELAYED RELEASE ORAL at 09:01

## 2022-01-25 RX ADMIN — PYRIDOXINE HCL TAB 50 MG 50 MG: 50 TAB at 09:01

## 2022-01-25 RX ADMIN — Medication 250 MG: at 09:01

## 2022-01-25 RX ADMIN — Medication 400 UNITS: at 09:01

## 2022-01-25 RX ADMIN — CYANOCOBALAMIN TAB 250 MCG 250 MCG: 250 TAB at 08:01

## 2022-01-25 RX ADMIN — CEFTRIAXONE 1 G: 1 INJECTION, POWDER, FOR SOLUTION INTRAMUSCULAR; INTRAVENOUS at 12:01

## 2022-01-25 RX ADMIN — METOCLOPRAMIDE 10 MG: 5 INJECTION, SOLUTION INTRAMUSCULAR; INTRAVENOUS at 12:01

## 2022-01-25 RX ADMIN — ASPIRIN 325 MG ORAL TABLET 325 MG: 325 PILL ORAL at 09:01

## 2022-01-25 NOTE — PT/OT/SLP PROGRESS
Occupational Therapy      Patient Name:  Devika Ashby   MRN:  [id number]    Patient not seen today secondary to Patient unwilling to participate,Patient ill (Comment) (Pt refused therapy  on this date, stating she feels too nauseated to get OOB or participate in therapy.  Will follow-up 1/26/2022.    1/25/2022

## 2022-01-25 NOTE — PLAN OF CARE
NURSING HOME ORDERS    01/25/2022  [facility]  KHADIJAH [facility]ALLADARES - TELEMETRY STEPDOWN ([place])  [address]  [facility] [address]  Dept: [phone]  Loc: [phone]     Admit to Nursing Home:  nursing home nursing orders  Diagnoses:  Active Hospital Problems    Diagnosis  POA    *UTI (urinary tract infection) [N39.0]  Yes    Pneumonia of right lower lobe due to infectious organism [J18.9]  Yes    COVID-19 virus infection [U07.1]  Yes    Severe malnutrition [E43]  Yes     Nutrition Problem:  Severe Protein-Calorie Malnutrition  Malnutrition in the context of Chronic Illness/Injury    Related to (etiology):  Inability to consume sufficient energy  Food- and nutrition-related knowledge deficit    Signs and Symptoms (as evidenced by):  Energy Intake: <75% of estimated energy requirement for 2 years  Body Fat Depletion: moderate and severe depletion of orbitals, triceps and thoracic and lumbar region   Muscle Mass Depletion: moderate and severe depletion of temples, clavicle region, scapular region, interosseous muscle and lower extremities     Interventions(treatment strategy):  Collaboration with other providers  [employer]  Nutrition education    Nutrition Diagnosis Status:  New        Hypokalemia [E87.6]  Yes    Weakness [R53.1]  Yes    Noncompliance with medication regimen [Z91.14]  Not Applicable    Hypertension, essential [I10]  Yes    Hyperlipidemia [E78.5]  Yes    Type 2 diabetes mellitus with complication, without long-term current use of insulin [E11.8]  Yes      Resolved Hospital Problems   No resolved problems to display.       Code Status: Full    Patient is homebound due to:  UTI (urinary tract infection)    Allergies:  Review of patient's allergies indicates:   Allergen Reactions    Mystic     Apple     Avelox [moxifloxacin]     Barley     Coconut     Corn containing products Other (See Comments)     Nausea and hives     Diphtheria-tetanus-pertuss vac     Doxycycline     Fish containing products     Green bean     Hazelnut     Honey     Insulins     Milk containing products     Nutmeg     Pcn [penicillins]     Pistachio nut     Shellfish containing products     Succinylcholine     Wheat containing prod        Vitals:  Routine    Diet: diabetic diet: 2000 calorie    Activities:   Activity as tolerated    Labs:  Per protocol     Nursing Precautions:  Fall    Consults:   PT to evaluate and treat- 3 times a week and Nutrition to evaluate and recommend diet     Miscellaneous Care: Diabetes Care: Diabetes: Check blood sugar. Fingerstick blood sugar AC and HS                   Diabetes Care:  SN to perform and educate Diabetic management with blood glucose monitoring:      Medications: Discontinue all previous medication orders, if any. See new list below.     Medication List      START taking these medications    cefdinir 300 MG capsule  Commonly known as: OMNICEF  Take 1 capsule (300 mg total) by mouth 2 (two) times daily. for 5 days  First Dose 1/26/22. Last Dose 1/30/22      Lactobac. rhamnosus GG-inulin 10 billion cell -200 mg Cpsp  Take 1 capsule by mouth once daily. for 10 days     potassium chloride SA 20 MEQ tablet  Commonly known as: K-DUR,KLOR-CON  Take 1 tablet (20 mEq total) by mouth 2 (two) times daily. for 5 days        CONTINUE taking these medications    amLODIPine 10 MG tablet  Commonly known as: NORVASC  Take 1 tablet (10 mg total) by mouth every evening.     ascorbic acid (vitamin C) 250 MG tablet  Commonly known as: VITAMIN C  Take 250 mg by mouth once daily.     aspirin 325 MG tablet  Take 325 mg by mouth once daily.     b complex vitamins tablet  Take 1 tablet by mouth once daily.     blood sugar diagnostic Strp  Use as directed 3-4 times daily to test blood glucose     blood-glucose meter kit  Use as directed 3-4 times daily to test blood glucose     cyanocobalamin 100 MCG tablet  Commonly known as:  "VITAMIN B-12  Take 100 mcg by mouth once daily.     diphenhydrAMINE 50 MG capsule  Commonly known as: BENADRYL  Take 1 capsule (50 mg total) by mouth every 6 (six) hours as needed for Allergies.     fluticasone propionate 50 mcg/actuation nasal spray  Commonly known as: FLONASE  2 sprays by Each Nare route once daily.     ibuprofen 200 MG tablet  Commonly known as: ADVIL,MOTRIN  Take 200 mg by mouth every 6 (six) hours as needed for Pain.     lancets Misc  Use as directed 3-4 times daily to test blood glucose     mupirocin 2 % ointment  Commonly known as: BACTROBAN  Apply topically 3 (three) times daily.     omeprazole 10 MG capsule  Commonly known as: PRILOSEC  Take 10 mg by mouth once daily.     pen needle, diabetic 32 gauge x 5/32" Ndle  Use as directed 4 times daily to inject insulin     sodium chloride 0.65 % nasal spray  Commonly known as: OCEAN  1 spray by Nasal route as needed (irritation).     tretinoin 0.05 % cream  Commonly known as: RETIN-A  Apply topically every evening.     VITAMIN B-6 100 MG Tab  Generic drug: pyridoxine (vitamin B6)  Take 50 mg by mouth once daily.     vitamin E 100 UNIT capsule  Take 100 Units by mouth once daily.              Immunizations Administered as of 1/25/2022     No immunizations on file.              Some patients may experience side effects after vaccination.  These may include fever, headache, muscle or joint aches.  Most symptoms resolve with 24-48 hours and do not require urgent medical evaluation unless they persist for more than 72 hours or symptoms are concerning for an unrelated medical condition.          _________________________________  Brittney Rader PA-C  01/25/2022  "

## 2022-01-25 NOTE — PLAN OF CARE
01/25/22 1213   Post-Acute Status   Post-Acute Authorization Placement     MAGI spoke with Angel ([phone]) at [facility] and told they willing to accept patient back.  MAGI faxed Nursing Home Orders, Overview, COVID results 1/21/22 to [facility] via [employer] for review. MAGI will continue to follow patient.    Jada Kohler LMSW  PRN - [facility]  [phone]

## 2022-01-25 NOTE — PLAN OF CARE
SW sent updated clinicals to [facility]  via [facility] for review. Patient is stable for discharge. SW will continue to follow patient.    SHUN LemonN - [facility]  [phone]

## 2022-01-25 NOTE — PLAN OF CARE
01/25/22 1259   Post-Acute Status   Post-Acute Authorization Placement   Post-Acute Placement Status Set-up Complete/Auth obtained   Discharge Plan   Discharge Plan A Return to nursing home       Patient has been accepted to return to [facility] and assigned to room 9 A.  MAGI informed bedside nurse (Reymundo to call for report [phone]) MAGI arranged ambulance transport via [facility]. Requested  time is 1340    Requested  time does not guarantee arrival time.  If transport does not arrive by 1600 please contact assigned SW or on-call for assistance.    Patient's bedside nurse and patient notified of the above.       Jada Kohler LMSW  PRN - [facility]  [phone]

## 2022-01-26 LAB
BACTERIA BLD CULT: NORMAL
BACTERIA BLD CULT: NORMAL

## 2022-01-27 NOTE — ASSESSMENT & PLAN NOTE
-Long history of refusal of most medications including cholesterol medications and diabetic medications.  -Continued to educate patient of importance of medication compliance.

## 2022-01-27 NOTE — DISCHARGE SUMMARY
[facility] - Tele[facility]down ([place] [address])  [facility] Medicine  Discharge Summary      Patient Name: Devika Ashby  MRN: [id number]  Patient Class: IP- Inpatient  Admission Date: 1/21/2022  Hospital Length of Stay: 2 days  Discharge Date and Time: 1/25/2022  5:10 PM  Attending Physician: No att. providers found   Discharging Provider: Brittney Rader PA-C  Primary Care Provider: Jeff Gaytan MD  Hospital Medicine Team: [facility] HOSP MED F Brittney Rader PA-C    HPI:   Devika Ashby is a 66 y.o. female with PMHx significant for HTN, HLD, DMII, CVA, severe malnutrition, and medical noncompliance who presents from [facility] for evaluation for decreased appetite. The patient reports she has had decreased appetite for the last several months which has worsened in the last few weeks. She notes continued weight loss since discharge from the hospital last month. Upon review of records she was admitted to [facility] in December for severe malnutrition and debility and discharged to [facility]. She endorses intermittent nausea but denies any vomiting or diarrhea. She reports fatigue, generalized weakness, and intermittent abdominal cramping. The patient also notes a productive cough beginning last week and tested positive for COVID-19 on Sunday. She denies any shortness of breath, fever, chills, chest pain, dysuria, or urinary frequency.    In the ED, VSSAF. CBC with WBC 3.38k. CMP with hypokalemia, low protein, and hypoalbuminemia. Lactate WNL. UA with 15 RBCs, 25 WBCs, occasional WBC clumps, and few bacteria. CXR concerning for possible new infiltrate right lower lung zone. The patient also tested positive again this evening for COVID-19. The patient received zofran, 1L LR, rocephin, and 50meq KCL replacement.      * No surgery found *      Hospital Course:   Devika Ashby is a 66 y.o. female admitted to hospital medicine for decreased PO intake, found to have a UTI and Covid 19. Started on CTX, transitioned to  cefdinir. Covid risk score 4, patient refused remdesivir. Oxygen saturation >94% during stay. Patient is stable for discharge back to [facility] with continued isolation for a total of 10 days.        Goals of Care Treatment Preferences:  Code Status: Full Code      Consults:   Consults (From admission, onward)        Status Ordering Provider     Inpatient consult to Registered Dietitian/Nutritionist  Once        Provider:  (Not yet assigned)    Completed JAC MCCORMICK          * UTI (urinary tract infection)  -UA reviewed, follow cx.  -Started on rocephin in the ED, continued cefdinir     COVID-19 virus infection  Patient is not hypoxic or tachypneic. She complains of a cough, decreased appetite, nausea, and fatigue. Patient was initially COVID + on 1/16 and was positive again today. Does not meet criteria for decadron or remdesivir.     Pneumonia of right lower lobe due to infectious organism  Patient c/o productive cough x1 week. Tested positive for COVID on 1/16 and again today. Patient is not hypoxic or tachypneic.    Hypokalemia  Resolved    Weakness  -Likely due to infection and severe malnutrition.  - high risk of fall / injury utilize all safety measures and fall precautions     Noncompliance with medication regimen  -Long history of refusal of most medications including cholesterol medications and diabetic medications.  -Continued to educate patient of importance of medication compliance.    Type 2 diabetes mellitus with complication, without long-term current use of insulin  - has refused metformin and insulin in the past  - glucose 166 on admit  - Last Hgb a1c 9.3      Final Active Diagnoses:    Diagnosis Date Noted POA    PRINCIPAL PROBLEM:  UTI (urinary tract infection) [N39.0] 01/21/2022 Yes    Pneumonia of right lower lobe due to infectious organism [J18.9] 01/21/2022 Yes    COVID-19 virus infection [U07.1] 01/21/2022 Yes    Severe malnutrition [E43] 12/13/2021 Yes    Hypokalemia  [E87.6] 12/12/2021 Yes    Weakness [R53.1] 12/11/2021 Yes    Noncompliance with medication regimen [Z91.14] 07/20/2016 Not Applicable    Hypertension, essential [I10] 07/20/2016 Yes    Hyperlipidemia [E78.5] 07/20/2016 Yes    Type 2 diabetes mellitus with complication, without long-term current use of insulin [E11.8] 07/20/2016 Yes      Problems Resolved During this Admission:       Discharged Condition: stable    Disposition: Skilled Nursing Facility    Follow Up:   Follow-up Information     Go to  [facility].    Specialty: Emergency Medicine  Why: As needed  Contact information:  [address] [facility] [address] [phone]                     Patient Instructions:   No discharge procedures on file.    Significant Diagnostic Studies: Labs: All labs within the past 24 hours have been reviewed    Pending Diagnostic Studies:     None         Medications:  Reconciled Home Medications:      Medication List      START taking these medications    cefdinir 300 MG capsule  Commonly known as: OMNICEF  Take 1 capsule (300 mg total) by mouth 2 (two) times daily. for 5 days     Lactobac. rhamnosus GG-inulin 10 billion cell -200 mg Cpsp  Take 1 capsule by mouth once daily. for 10 days     potassium chloride SA 20 MEQ tablet  Commonly known as: K-DUR,KLOR-CON  Take 1 tablet (20 mEq total) by mouth 2 (two) times daily. for 5 days        CONTINUE taking these medications    amLODIPine 10 MG tablet  Commonly known as: NORVASC  Take 1 tablet (10 mg total) by mouth every evening.     ascorbic acid (vitamin C) 250 MG tablet  Commonly known as: VITAMIN C  Take 250 mg by mouth once daily.     aspirin 325 MG tablet  Take 325 mg by mouth once daily.     b complex vitamins tablet  Take 1 tablet by mouth once daily.     blood sugar diagnostic Strp  Use as directed 3-4 times daily to test blood glucose     blood-glucose meter kit  Use as directed 3-4 times daily to test blood glucose    "  cyanocobalamin 100 MCG tablet  Commonly known as: VITAMIN B-12  Take 100 mcg by mouth once daily.     diphenhydrAMINE 50 MG capsule  Commonly known as: BENADRYL  Take 1 capsule (50 mg total) by mouth every 6 (six) hours as needed for Allergies.     fluticasone propionate 50 mcg/actuation nasal spray  Commonly known as: FLONASE  2 sprays by Each Nare route once daily.     ibuprofen 200 MG tablet  Commonly known as: ADVIL,MOTRIN  Take 200 mg by mouth every 6 (six) hours as needed for Pain.     lancets Misc  Use as directed 3-4 times daily to test blood glucose     mupirocin 2 % ointment  Commonly known as: BACTROBAN  Apply topically 3 (three) times daily.     omeprazole 10 MG capsule  Commonly known as: PRILOSEC  Take 10 mg by mouth once daily.     pen needle, diabetic 32 gauge x 5/32" Ndle  Use as directed 4 times daily to inject insulin     sodium chloride 0.65 % nasal spray  Commonly known as: OCEAN  1 spray by Nasal route as needed (irritation).     tretinoin 0.05 % cream  Commonly known as: RETIN-A  Apply topically every evening.     VITAMIN B-6 100 MG Tab  Generic drug: pyridoxine (vitamin B6)  Take 50 mg by mouth once daily.     vitamin E 100 UNIT capsule  Take 100 Units by mouth once daily.            Indwelling Lines/Drains at time of discharge:   Lines/Drains/Airways     Drain            Female External Urinary Catheter 01/21/22 1316 5 days                Time spent on the discharge of patient: 37 minutes         Brittney Rader PA-C  Department of Hospital Medicine  [facility] - Telemetry Stepdown ([place] [address])  "

## 2022-01-27 NOTE — ASSESSMENT & PLAN NOTE
Patient c/o productive cough x1 week. Tested positive for COVID on 1/16 and again today. Patient is not hypoxic or tachypneic.

## 2022-01-27 NOTE — ASSESSMENT & PLAN NOTE
-Likely due to infection and severe malnutrition.  - high risk of fall / injury utilize all safety measures and fall precautions

## 2022-01-27 NOTE — ASSESSMENT & PLAN NOTE
Patient is not hypoxic or tachypneic. She complains of a cough, decreased appetite, nausea, and fatigue. Patient was initially COVID + on 1/16 and was positive again today. Does not meet criteria for decadron or remdesivir.

## 2022-02-01 NOTE — PHYSICIAN QUERY
PT Name: Devika Ashby  MR #: [id number]     DOCUMENTATION CLARIFICATION     CDS/: Nayana Vázquez RN              Contact information: [email or web address]  This form is a permanent document in the medical record.    Query Date: February 1, 2022    By submitting this query, we are merely seeking further clarification of documentation.  Please utilize your independent clinical judgment when addressing the question(s) below.  The Medical Record contains the following:   Indicators   Supporting Clinical Findings Location in Medical Record   x Pneumonia documented Pneumonia of right lower lobe due to infectious organism  Started on rocephin in the ED, continue for now.   Has PCN allergy, doxycycline allergy, and history of myasthenia gravis so was not started on azithromycin    Chest x-ray reveals right lower lobe pneumonia.    Urinalysis consistent with UTI.     Will administer ceftriaxone 2 g.   No azithromycin due to myasthenia   1/24 Hosp Med MD PN          1/21 ED MD JONES   x Chest X-Ray/CT Scan CXR concerning for possible new infiltrate right lower lung zone. 1/21 HP     x PaO2    PaCO2     O2 sat O2 sat 92% room air 1/24 FLowsheet     x WBC WBC= 3.38 1/21 Lab     x Vital Signs Temp= 99  HR= 103  RR= 20 1/22 Flowsheet     x Cultures  COVID  Positive    Gram stain- no S aureus or pseudomas  No organism seen   1/21 Lab    1/21 Respiratory culture   x Treatment  patient refused remdesivir.    Ceftriaxone IVPB    Indications of use:  Lower Respiratory Infections/  UTI    Reconciled Home Medications:  Cefdinir 300mg PO twice daily for 5 days    CXR  Cardiac monitor  Pulse oximetry     1/24 Hosp Med MD PN     1/21-25 MAR      1/25 DC summary      1/21 NSG orders      Supplemental O2      Dysphagia/Swallow study      Other       Provider- If possible, please specify type of Pneumonia:    Carlos all that apply    [   ] Bacteria Pneumonia     [   ] Viral Pneumonia      [ x  ] COVID-19 Pneumonia     [   ]  Bacterial and COVID Pneumonia     [   ] Unspecified Pneumonia     [   ] Other respiratory diagnosis (please specify): _________     [  ] Clinically undetermined       Please document in your progress notes daily for the duration of treatment, until resolved, and include in your discharge summary.     Form No. [id number]

## 2022-02-03 LAB
ACID FAST MOD KINY STN SPEC: NORMAL
MYCOBACTERIUM SPEC QL CULT: NORMAL

## 2022-03-16 ENCOUNTER — PATIENT MESSAGE (OUTPATIENT)
Dept: ADMINISTRATIVE | Facility: HOSPITAL | Age: 67
End: 2022-03-16
Payer: MEDICARE

## 2022-04-19 ENCOUNTER — EXTERNAL HOSPITAL ADMISSION (OUTPATIENT)
Dept: SKILLED NURSING FACILITY | Facility: HOSPITAL | Age: 67
End: 2022-04-19
Payer: MEDICARE

## 2022-04-19 DIAGNOSIS — Z91.148 NONCOMPLIANCE WITH MEDICATION REGIMEN: Primary | ICD-10-CM

## 2022-04-19 PROCEDURE — 99309 SBSQ NF CARE MODERATE MDM 30: CPT | Mod: ,,, | Performed by: INTERNAL MEDICINE

## 2022-04-19 PROCEDURE — 99309 PR NURSING FAC CARE, SUBSEQ, SIGNIF COMPLIC: ICD-10-PCS | Mod: ,,, | Performed by: INTERNAL MEDICINE

## 2022-04-19 NOTE — PROGRESS NOTES
[facility]   New Visit Progress Note   Recent Hospital Discharge     PRESENTING HISTORY     Chief Complaint/Reason for Admission:  Follow up Hospital Discharge   PCP: Jeff Gaytan MD    History of Present Illness:  Ms. Devika Ashby is a 67 y.o. female who was recently admitted to the hospital.Devika Ashby is a 66 y.o. female admitted to hospital medicine for decreased PO intake, found to have a UTI and Covid 19. Started on CTX. Place on isolation precautions. Covid risk score 4, patient refused remdesivir. Plan for return to [facility] when stable.             ___________________________________________________________________    Today: for several weeks has not been eating or taking meds marked malnutrition - chronic issue., spoke to daughter, hospice may be appropriate will consult.        Review of Systems  General ROS: negative for chills, fever or weight loss  Psychological ROS: negative for hallucination, depression or suicidal ideation  Ophthalmic ROS: negative for blurry vision, photophobia or eye pain  ENT ROS: negative for epistaxis, sore throat or rhinorrhea  Respiratory ROS: no cough, shortness of breath, or wheezing  Cardiovascular ROS: no chest pain or dyspnea on exertion  Gastrointestinal ROS: no abdominal pain, change in bowel habits, or black/ bloody stools  Genito-Urinary ROS: no dysuria, trouble voiding, or hematuria  Musculoskeletal ROS: negative for gait disturbance or muscular weakness  Neurological ROS: no syncope or seizures; no ataxia  Dermatological ROS: negative for pruritis, rash and jaundice          PAST HISTORY:     Past Medical History:   Diagnosis Date    CVA (cerebral infarction)     Diabetes mellitus     HLD (hyperlipidemia)     Hypertension     Myasthenia gravis     Severe malnutrition 12/13/2021    Nutrition Problem: Severe Protein-Calorie Malnutrition Malnutrition in the context of Chronic Illness/Injury  Related to (etiology):  Inability to consume sufficient energy Food- and nutrition-related knowledge deficit  Signs and Symptoms (as evidenced by): Energy Intake: <75% of estimated energy requirement for 2 years Body Fat Depletion: moderate and severe depletion of orbitals, triceps and thor       Past Surgical History:   Procedure Laterality Date    bladder lift      BONE MARROW BIOPSY      CHOLECYSTECTOMY      HYSTERECTOMY      PITUITARY SURGERY         Family History   Problem Relation Age of Onset    Arthritis Mother     Cancer Mother     Hearing loss Mother     Heart disease Mother     Hyperlipidemia Mother     Hypertension Mother     Stroke Mother     Alcohol abuse Father     Cancer Father     Diabetes Father     Drug abuse Father     Kidney disease Sister          MEDICATIONS & ALLERGIES:     Current Outpatient Medications on File Prior to Visit   Medication Sig Dispense Refill    amLODIPine (NORVASC) 10 MG tablet Take 1 tablet (10 mg total) by mouth every evening. 90 tablet 3    ascorbic acid (VITAMIN C) 250 MG tablet Take 250 mg by mouth once daily.      aspirin 325 MG tablet Take 325 mg by mouth once daily.      b complex vitamins tablet Take 1 tablet by mouth once daily.      blood sugar diagnostic Strp Use as directed 3-4 times daily to test blood glucose 100 each 3    blood-glucose meter kit Use as directed 3-4 times daily to test blood glucose 1 each 0    cyanocobalamin (VITAMIN B-12) 100 MCG tablet Take 100 mcg by mouth once daily.      diphenhydrAMINE (BENADRYL) 50 MG capsule Take 1 capsule (50 mg total) by mouth every 6 (six) hours as needed for Allergies.  0    fluticasone (FLONASE) 50 mcg/actuation nasal spray 2 sprays by Each Nare route once daily. 16 g 1    ibuprofen (ADVIL,MOTRIN) 200 MG tablet Take 200 mg by mouth every 6 (six) hours as needed for Pain.      lancets Misc Use as directed 3-4 times daily to test blood glucose 100 each 3    mupirocin (BACTROBAN) 2 % ointment Apply topically 3  "(three) times daily. 22 g 1    omeprazole (PRILOSEC) 10 MG capsule Take 10 mg by mouth once daily.      pen needle, diabetic 32 gauge x 5/32" Ndle Use as directed 4 times daily to inject insulin 100 each 3    pyridoxine, vitamin B6, (VITAMIN B-6) 100 MG Tab Take 50 mg by mouth once daily.      sodium chloride (OCEAN) 0.65 % nasal spray 1 spray by Nasal route as needed (irritation).  12    tretinoin (RETIN-A) 0.05 % cream Apply topically every evening. 20 g 2    vitamin E 100 UNIT capsule Take 100 Units by mouth once daily.       No current facility-administered medications on file prior to visit.        Review of patient's allergies indicates:   Allergen Reactions    [place]     Apple     Avelox [moxifloxacin]     Barley     Coconut     Corn containing products Other (See Comments)     Nausea and hives    Diphtheria-tetanus-pertuss vac     Doxycycline     Fish containing products     Green bean     Hazelnut     Honey     Insulins     Milk containing products     Nutmeg     Pcn [penicillins]     Pistachio nut     Shellfish containing products     Succinylcholine     Wheat containing prod        OBJECTIVE:     Vital Signs:  LMP  (LMP Unknown)   Wt Readings from Last 1 Encounters:   01/22/22 0027 34.7 kg (76 lb 6.4 oz)   01/21/22 2205 34.7 kg (76 lb 6.4 oz)   01/21/22 1929 39.2 kg (86 lb 6.7 oz)     There is no height or weight on file to calculate BMI.        Physical Exam:  LMP  (LMP Unknown)   General appearance: alert, cooperative, no distress  Constitutional:Oriented to person, place, and time  + appears well-developed and well-nourished.   HEENT: Normocephalic, atraumatic, neck symmetrical, no nasal discharge   Eyes: conjunctivae/corneas clear, PERRL, EOM's intact  Lungs: clear to auscultation bilaterally, no dullness to percussion bilaterally  Heart: regular rate and rhythm without rub; no displacement of the PMI   Abdomen: soft, non-tender; bowel sounds normoactive; no " organomegaly  Extremities: extremities symmetric; no clubbing, cyanosis, or edema  Integument: Skin color, texture, turgor normal; no rashes; hair distrubution normal  Neurologic: Alert and oriented X 3, normal strength, normal coordination and gait  Psychiatric: no pressured speech; normal affect; no evidence of impaired cognition     Laboratory  Lab Results   Component Value Date    WBC 4.24 01/24/2022    HGB 11.6 (L) 01/24/2022    HCT 34.5 (L) 01/24/2022    MCV 85 01/24/2022     01/24/2022     BMP  Lab Results   Component Value Date     01/24/2022    K 3.2 (L) 01/24/2022     01/24/2022    CO2 27 01/24/2022    BUN 11 01/24/2022    CREATININE 0.6 01/24/2022    CALCIUM 8.4 (L) 01/24/2022    ANIONGAP 12 01/24/2022    ESTGFRAFRICA >60.0 01/24/2022    EGFRNONAA >60.0 01/24/2022     Lab Results   Component Value Date    ALT 12 01/24/2022    AST 15 01/24/2022    ALKPHOS 58 01/24/2022    BILITOT 0.4 01/24/2022     Lab Results   Component Value Date    INR 0.9 01/21/2022    INR 1.0 12/11/2021    INR 1.0 04/11/2016     Lab Results   Component Value Date    HGBA1C 9.3 (H) 12/16/2021       Diagnostic Results:        TRANSITION OF CARE:         ASSESSMENT & PLAN:     HIGH RISK CONDITION(S):   Prior UTI (urinary tract infection)     PriorCOVID-19 virus infection               Severe malnutrition  Body mass index (BMI) less than 19  Nutrition consulted. Body mass index is 13.97 kg/m².. Encourage maximal PO intake. Diet supplementation ordered per nutrition approval. Will encourage PO and monitor closely for weight changes.     Hypokalemia  Patient has hypokalemia which is currently uncontrolled. Last electrolytes reviewed-       Recent Labs   Lab 01/24/22  0338   K 3.2*   . Will replace potassium and monitor electrolytes closely. Continuous telemetry.     Weakness  -Likely due to infection and severe malnutrition.  -PT/OT  - high risk of fall / injury utilize all safety measures and fall precautions       Noncompliance with medication regimen  -Long history of refusal of most medications including cholesterol medications and diabetic medications.  -Continue to educate patient of importance of medication compliance.     Hyperlipidemia  - refuses to take statin as she states it gives her body aches. I offered to change her medication as that is a common side effect, and she refused     Hypertension, essential  -Currently normotensive.  -Continue norvasc 10mg nightly     Type 2 diabetes mellitus with complication, without long-term current use of insulin  - has refused metformin and insulin in the past  - glucose 166 on admit  - Last Hgb a1c 9.3  - started on LDSSI, though patient will likely refuse it  - Alex ACHS  - hypoglycemic protocol  - diabetic diet         Instructions for the patient:      Scheduled Follow-up :  Future Appointments   Date Time Provider Department Center   4/21/2022  7:00 AM [facility] SPEC LAB [facility] SPECLAB [facility]       Post Visit Medication List:     Medication List          Accurate as of April 19, 2022 11:20 AM. If you have any questions, ask your nurse or doctor.            CONTINUE taking these medications    amLODIPine 10 MG tablet  Commonly known as: NORVASC  Take 1 tablet (10 mg total) by mouth every evening.     ascorbic acid (vitamin C) 250 MG tablet  Commonly known as: VITAMIN C     aspirin 325 MG tablet     b complex vitamins tablet     blood sugar diagnostic Strp  Use as directed 3-4 times daily to test blood glucose     blood-glucose meter kit  Use as directed 3-4 times daily to test blood glucose     cyanocobalamin 100 MCG tablet  Commonly known as: VITAMIN B-12     diphenhydrAMINE 50 MG capsule  Commonly known as: BENADRYL  Take 1 capsule (50 mg total) by mouth every 6 (six) hours as needed for Allergies.     fluticasone propionate 50 mcg/actuation nasal spray  Commonly known as: FLONASE  2 sprays by Each Nare route once daily.    "  ibuprofen 200 MG tablet  Commonly known as: ADVIL,MOTRIN     lancets Misc  Use as directed 3-4 times daily to test blood glucose     mupirocin 2 % ointment  Commonly known as: BACTROBAN  Apply topically 3 (three) times daily.     omeprazole 10 MG capsule  Commonly known as: PRILOSEC     pen needle, diabetic 32 gauge x 5/32" Ndle  Use as directed 4 times daily to inject insulin     sodium chloride 0.65 % nasal spray  Commonly known as: OCEAN  1 spray by Nasal route as needed (irritation).     tretinoin 0.05 % cream  Commonly known as: RETIN-A  Apply topically every evening.     VITAMIN B-6 100 MG Tab  Generic drug: pyridoxine (vitamin B6)     vitamin E 100 UNIT capsule            Signing Physician:  Reymundo Johnson MD   "

## 2022-07-19 ENCOUNTER — PATIENT MESSAGE (OUTPATIENT)
Dept: RESEARCH | Facility: CLINIC | Age: 67
End: 2022-07-19
Payer: MEDICARE

## 2022-10-26 ENCOUNTER — PATIENT MESSAGE (OUTPATIENT)
Dept: RESEARCH | Facility: HOSPITAL | Age: 67
End: 2022-10-26
Payer: MEDICARE

## 2022-12-07 ENCOUNTER — PES CALL (OUTPATIENT)
Dept: ADMINISTRATIVE | Facility: CLINIC | Age: 67
End: 2022-12-07
Payer: MEDICARE

## 2023-07-31 NOTE — TELEPHONE ENCOUNTER
I contacted patient concerning her recent laboratory again.  I explained that her blood sugar is severely high, could be life-threatening.  She had a long wanted discussion concerning she will not take medications, she thinks they are poison.  She refuses a referral to Endocrinology.  I explained loss of life, loss of limb, loss of eyesight, loss of kidney function and dialysis, stroke or heart attack as potential complications.  She cut me off and began long winded discussion about how medicines make her sick, she cannot take them, will not take them and will not go to an endocrinologist.  States she has been through all that before and will try to manage it with diet on her own.    Plan:  Will discuss future interactions with med legal and patient relations.   Altered Nutrition Related Lab Values